# Patient Record
Sex: FEMALE | Race: WHITE | NOT HISPANIC OR LATINO | Employment: FULL TIME | URBAN - METROPOLITAN AREA
[De-identification: names, ages, dates, MRNs, and addresses within clinical notes are randomized per-mention and may not be internally consistent; named-entity substitution may affect disease eponyms.]

---

## 2021-05-29 ENCOUNTER — RECORDS - HEALTHEAST (OUTPATIENT)
Dept: ADMINISTRATIVE | Facility: CLINIC | Age: 61
End: 2021-05-29

## 2021-05-30 ENCOUNTER — RECORDS - HEALTHEAST (OUTPATIENT)
Dept: ADMINISTRATIVE | Facility: CLINIC | Age: 61
End: 2021-05-30

## 2021-05-31 ENCOUNTER — RECORDS - HEALTHEAST (OUTPATIENT)
Dept: ADMINISTRATIVE | Facility: CLINIC | Age: 61
End: 2021-05-31

## 2021-08-03 ENCOUNTER — TRANSFERRED RECORDS (OUTPATIENT)
Dept: HEALTH INFORMATION MANAGEMENT | Facility: CLINIC | Age: 61
End: 2021-08-03

## 2021-11-05 ENCOUNTER — OFFICE VISIT (OUTPATIENT)
Dept: NEUROLOGY | Facility: CLINIC | Age: 61
End: 2021-11-05
Payer: COMMERCIAL

## 2021-11-05 ENCOUNTER — LAB (OUTPATIENT)
Dept: LAB | Facility: HOSPITAL | Age: 61
End: 2021-11-05
Payer: COMMERCIAL

## 2021-11-05 VITALS
HEIGHT: 67 IN | DIASTOLIC BLOOD PRESSURE: 89 MMHG | WEIGHT: 162 LBS | HEART RATE: 70 BPM | BODY MASS INDEX: 25.43 KG/M2 | SYSTOLIC BLOOD PRESSURE: 130 MMHG

## 2021-11-05 DIAGNOSIS — G93.0 ARACHNOID CYST: ICD-10-CM

## 2021-11-05 DIAGNOSIS — G43.109 MIGRAINE WITH AURA AND WITHOUT STATUS MIGRAINOSUS, NOT INTRACTABLE: ICD-10-CM

## 2021-11-05 PROBLEM — K63.5 COLON POLYPS: Status: ACTIVE | Noted: 2020-05-06

## 2021-11-05 PROBLEM — R07.89 ATYPICAL CHEST PAIN: Status: ACTIVE | Noted: 2020-05-06

## 2021-11-05 LAB
ALBUMIN SERPL-MCNC: 4.2 G/DL (ref 3.5–5)
ALP SERPL-CCNC: 68 U/L (ref 45–120)
ALT SERPL W P-5'-P-CCNC: 28 U/L (ref 0–45)
AST SERPL W P-5'-P-CCNC: 26 U/L (ref 0–40)
BILIRUB DIRECT SERPL-MCNC: 0.2 MG/DL
BILIRUB SERPL-MCNC: 0.5 MG/DL (ref 0–1)
PROT SERPL-MCNC: 7.5 G/DL (ref 6–8)

## 2021-11-05 PROCEDURE — 99205 OFFICE O/P NEW HI 60 MIN: CPT | Performed by: PSYCHIATRY & NEUROLOGY

## 2021-11-05 PROCEDURE — 80076 HEPATIC FUNCTION PANEL: CPT

## 2021-11-05 PROCEDURE — 36415 COLL VENOUS BLD VENIPUNCTURE: CPT

## 2021-11-05 RX ORDER — DIVALPROEX SODIUM 500 MG/1
500 TABLET, EXTENDED RELEASE ORAL AT BEDTIME
Qty: 30 TABLET | Refills: 11 | Status: SHIPPED | OUTPATIENT
Start: 2021-11-05 | End: 2022-02-11

## 2021-11-05 RX ORDER — MULTIVITAMIN,THERAPEUTIC
1 TABLET ORAL
COMMUNITY

## 2021-11-05 RX ORDER — CETIRIZINE HYDROCHLORIDE 10 MG/1
10 TABLET ORAL
COMMUNITY

## 2021-11-05 RX ORDER — ESCITALOPRAM OXALATE 10 MG/1
15 TABLET ORAL DAILY
COMMUNITY

## 2021-11-05 RX ORDER — RIZATRIPTAN BENZOATE 10 MG/1
10 TABLET, ORALLY DISINTEGRATING ORAL
Qty: 12 TABLET | Refills: 3 | Status: SHIPPED | OUTPATIENT
Start: 2021-11-05 | End: 2022-02-11

## 2021-11-05 ASSESSMENT — MIFFLIN-ST. JEOR: SCORE: 1342.46

## 2021-11-05 NOTE — LETTER
2021         RE: Ramila England  [address] [facility] [address]        Dear Colleague,    Thank you for referring your patient, Ramila England, to the [facility] [place]. Please see a copy of my visit note below.    NEUROLOGY CONSULTATION NOTE       [facility] [place]  [address] [place] [address]  Tel: [phone]  Fax: [phone]  [email or web address]     Ramila England,  1960, MRN [id number]  PCP: Marianela Puente  Date: 2021     ASSESSMENT & PLAN     Visit Diagnosis  1. Data Unavailable     Migraine headache with aura  Pleasant 61-year-old female with history of atypical chest pain, right frontal arachnoid cyst, seizures in  who was referred for evaluation of worsening headaches.  Her history sounds fairly typical for migraine headache with aura.  I have recommended:    1.  Start Depakote  mg at bedtime for migraine prophylaxis  2.  Check hepatic profile  3.  Maxalt MLT 10 mg sublingually as needed headache she can repeat 1 tablet in 2-hour if needed.  Maximum 3 tablets in 24 hours  4.  Patient has history of seizures in  and was on anticonvulsants for 5 years and discontinued irrigation in  and has remained symptom-free  5.  She has a right frontal arachnoid cyst that has remained stable since  and no intervention is needed but I would recommend repeating a imaging study in 1 year to ensure stability.  6.  Follow-up will be in 2 months    Thank you again for this referral, please feel free to contact me if you have any questions.    Efren Treviño MD  [facility]  (Formerly, [facility]     REASON FOR CONSULTATION Headache        HISTORY OF PRESENT ILLNESS     We have been requested by Marianela Puente to evaluate Ramila England who is a 61 year old  female for headache    Patient is a 61-year-old female with history of atypical  chest pain, right frontal arachnoid cyst, seizures who was referred for evaluation of worsening headaches.  According to patient her headaches started more than a year ago but in July she went to [place] and when she came back she had severe headache that lasted for a whole week.  Since then she has been having episodic headache that last the whole day.  She usually gets blurred vision followed by some squiggly line usually in her left visual field and afterwards she developed nausea and photophobia and phonophobia.  These headaches are occurring at least 4 times a month.  She is also noticed that the looking at some moving picture she gets dizzy and gets nauseated.  She had a MRI of the brain that showed right frontal arachnoid cyst along with small vessel ischemic disease.  She cannot identify any triggers for her headaches.  She was seen by her primary care physician and was treated for Lyme's disease with antibiotics.  Currently she is taking over-the-counter pain medication to treat her headaches.  She did try Ubrelvy for a short period but it was not approved by her insurance company.     PROBLEM LIST   Patient Active Problem List   Diagnosis Code     Atypical chest pain R07.89     Colon polyps K63.5     Right frontal arachnoid cyst G93.0     Migraine with aura and without status migrainosus, not intractable G43.109         PAST MEDICAL & SURGICAL HISTORY     Past Medical History:   Patient  has a past medical history of Seizure disorder (H) (2005).    Surgical History:  She  has no past surgical history on file.     SOCIAL HISTORY     Reviewed, and she  reports that she has never smoked. She has never used smokeless tobacco. She reports current alcohol use.     FAMILY HISTORY     Reviewed, and family history includes Alzheimer Disease in her mother; Asthma in her brother and father; Colon Cancer in her brother; Hypertension in her father; Myocardial Infarction in her maternal grandfather.     ALLERGIES  "    Allergies   Allergen Reactions     Penicillins Rash         REVIEW OF SYSTEMS     A 12 point review of system was performed and was negative except as outlined in the history of present illness.     HOME MEDICATIONS     Current Outpatient Rx   Medication Sig Dispense Refill     Calcium Carbonate Antacid 1177 MG CHEW        cetirizine (ZYRTEC) 10 MG tablet Take 10 mg by mouth       cholecalciferol 50 MCG (2000 UT) tablet Take 2,000 Units by mouth       divalproex sodium extended-release (DEPAKOTE ER) 500 MG 24 hr tablet Take 1 tablet (500 mg) by mouth At Bedtime 30 tablet 11     escitalopram (LEXAPRO) 10 MG tablet Take 10 mg by mouth daily       MAGNESIUM PO        Multiple Vitamin (THERA-TABS) TABS Take 1 tablet by mouth       rizatriptan (MAXALT-MLT) 10 MG ODT Take 1 tablet (10 mg) by mouth at onset of headache for migraine May repeat in 2 hours. Max 3 tablets/24 hours. 12 tablet 3         PHYSICAL EXAM     Vital signs  /89 (BP Location: Left arm, Patient Position: Sitting)   Pulse 70   Ht 1.702 m (5' 7\")   Wt 73.5 kg (162 lb)   BMI 25.37 kg/m      Weight:   162 lbs 0 oz    Patient is alert and oriented x4 in no acute distress. Vital signs were reviewed and are documented in electronic medical record. Neck was supple, no carotid bruits, thyromegaly, JVD, or lymphadenopathy was noted.   NEUROLOGY EXAM:    Patient s speech was normal with no aphasia or dysarthria. Mentation, and affect were also normal.     Funduscopic exam was normal, with normal cup to disc ratio. Cranial nerves II -XII were intact.     Patient had normal mass, tone and motor strength was 5/5 in all extremities without pronator drift.     Sensation was intact to light touch, pinprick, and vibratory sensation.     Reflexes were 1+ symmetrical with downgoing toes.     No dysmetria noted on FNF or HKS. Romberg was negative.    Gait testing was normal. Able to walk on toes/heels. Tandem walk normal.     DIAGNOSTIC STUDIES     PERTINENT " RADIOLOGY  Following imaging studies were reviewed:     CT BRAIN 8/12/2005  ASYMMETRIC FRONTAL GYRI ON THE RIGHT.  POSSIBLE CONGENITAL   ANOMALY/DYSPLASIA.  CONFIRM WITH MRI AS INDICATED.    MRI BRAIN 5/3/2021  1.  Asymmetric prominence of the CSF space of the right frontal convexity with thinning of the overlying calvarium most likely representing an arachnoid cyst.  Allowing for technical difference this is unchanged compared to study dated 8/12/2005  2.  No restricted diffusion blood product deposition or other acute finding  3.  Nonspecific T2 flair white matter hyperintensity  4.  Regressive remodeling of the right mandibular condyle indicative of internal derangement of the temporomandibular joint  5.  Mucosal thickening and retained secretion in the ethmoid sinuses.  Mucosal thickening with air-fluid level in the left maxillary sinus represent acute sinusitis.  Fluid in the mastoid air cells bilaterally     PERTINENT LABS  Following labs were reviewed:  No visits with results within 3 Month(s) from this visit.   Latest known visit with results is:   No results found for any previous visit.        Total time spent for face to face visit, reviewing labs/imaging studies, counseling and coordination of care was: 1 Hour spent on the date of the encounter doing chart review, review of outside records, review of test results, interpretation of tests, patient visit and documentation       This note was dictated using voice recognition software.  Any grammatical or context distortions are unintentional and inherent to the software.    Orders Placed This Encounter   Procedures     Hepatic function panel      New Prescriptions    DIVALPROEX SODIUM EXTENDED-RELEASE (DEPAKOTE ER) 500 MG 24 HR TABLET    Take 1 tablet (500 mg) by mouth At Bedtime    RIZATRIPTAN (MAXALT-MLT) 10 MG ODT    Take 1 tablet (10 mg) by mouth at onset of headache for migraine May repeat in 2 hours. Max 3 tablets/24 hours.      Modified Medications     No medications on file              Again, thank you for allowing me to participate in the care of your patient.        Sincerely,        Efren Treviño MD

## 2021-11-05 NOTE — NURSING NOTE
Patient: Ramila England  ZEHRAB: 1960  Sex: Female  Phone: [phone]    [facility] MRN: [id number]  Exam Date: 08/03/2021     EXAM: MRI BRAIN WITHOUT CONTRAST    CLINICAL INFORMATION: 60-year-old woman with headaches, and dizziness.    TECHNICAL INFORMATION: MRI brain was obtained on 1.5 Yarelis magnet including sagittal T1, axial T2, FLAIR, T1, gradient echo, and diffusion-weighted images with apparent diffusion coefficient maps. Coronal T2 weighted images.    INTERPRETATION: This exam is compared to computed tomography study dated 8/12/2005.    There are no areas of increased signal on the diffusion-weighted images to suggest an acute or subacute infarction. No blood product deposition within the brain on the gradient echo images. Ventricles are within normal limits. There is asymmetric prominence of the CSF space at the right frontal convexity with mass effect on the underlying brain and thinning of the overlying calvarium similar to the comparison study. Intracranial flow voids are unremarkable. There are a few punctate T2/FLAIR hyperintensities in the periventricular and subcortical white matter.    The craniocervical junction is normal. Midline structures including the sella and suprasellar region as well as the corpus callosum are unremarkable. The calvarial marrow signal is normal.    The orbits are normal in appearance. There is undulating nasal septal deviation. Minimal mucosal thickening and retained secretions in the ethmoid air cells bilaterally. Mucosal thickening in the left maxillary sinus with small air-fluid level. Fluid in the mastoid air cells bilaterally. Regressive remodeling of the right mandibular condyle.    CONCLUSION:    1. Asymmetric prominence of the CSF space at the right frontal convexity with thinning of the overlying calvarium most likely representing an arachnoid cyst. Allowing for technical differences this is unchanged compared to computed tomography study dated  8/12/2005.    2. No restricted diffusion, blood product deposition, or other acute findings.    3. Nonspecific T2/FLAIR white matter hyperintensities.    4. Regressive remodeling of the right mandibular condyle indicative of internal derangement of the temporomandibular joint.    5. Mucosal thickening and retained secretions in the ethmoid sinuses. Mucosal thickening with air-fluid level in the left maxillary sinus which may represent acute sinusitis. Fluid in the mastoid air cells bilaterally.    LPB        Electronically signed on 8/4/2021 2:44:00 PM by L. Paul Broadbent, M.D.   ---

## 2021-11-05 NOTE — PROGRESS NOTES
NEUROLOGY CONSULTATION NOTE       [facility]UR[facility] [place]  [address] [place] [address]  Tel: [phone]  Fax: [phone]  [email or web address]     Ramila England,  1960, MRN [id number]  PCP: Marianela Puente  Date: 2021     ASSESSMENT & PLAN     Visit Diagnosis  1. Data Unavailable     Migraine headache with aura  Pleasant 61-year-old female with history of atypical chest pain, right frontal arachnoid cyst, seizures in  who was referred for evaluation of worsening headaches.  Her history sounds fairly typical for migraine headache with aura.  I have recommended:    1.  Start Depakote  mg at bedtime for migraine prophylaxis  2.  Check hepatic profile  3.  Maxalt MLT 10 mg sublingually as needed headache she can repeat 1 tablet in 2-hour if needed.  Maximum 3 tablets in 24 hours  4.  Patient has history of seizures in  and was on anticonvulsants for 5 years and discontinued irrigation in  and has remained symptom-free  5.  She has a right frontal arachnoid cyst that has remained stable since  and no intervention is needed but I would recommend repeating a imaging study in 1 year to ensure stability.  6.  Follow-up will be in 2 months    Thank you again for this referral, please feel free to contact me if you have any questions.    Efren Treviño MD  [facility]  ([facility], [facility]     REASON FOR CONSULTATION Headache        HISTORY OF PRESENT ILLNESS     We have been requested by Marianela Puente to evaluate Ramila England who is a 61 year old  female for headache    Patient is a 61-year-old female with history of atypical chest pain, right frontal arachnoid cyst, seizures who was referred for evaluation of worsening headaches.  According to patient her headaches started more than a year ago but in July she went to [place] and when she came back she had severe headache that lasted for  a whole week.  Since then she has been having episodic headache that last the whole day.  She usually gets blurred vision followed by some squiggly line usually in her left visual field and afterwards she developed nausea and photophobia and phonophobia.  These headaches are occurring at least 4 times a month.  She is also noticed that the looking at some moving picture she gets dizzy and gets nauseated.  She had a MRI of the brain that showed right frontal arachnoid cyst along with small vessel ischemic disease.  She cannot identify any triggers for her headaches.  She was seen by her primary care physician and was treated for Lyme's disease with antibiotics.  Currently she is taking over-the-counter pain medication to treat her headaches.  She did try Ubrelvy for a short period but it was not approved by her insurance company.     PROBLEM LIST   Patient Active Problem List   Diagnosis Code     Atypical chest pain R07.89     Colon polyps K63.5     Right frontal arachnoid cyst G93.0     Migraine with aura and without status migrainosus, not intractable G43.109         PAST MEDICAL & SURGICAL HISTORY     Past Medical History:   Patient  has a past medical history of Seizure disorder (H) (2005).    Surgical History:  She  has no past surgical history on file.     SOCIAL HISTORY     Reviewed, and she  reports that she has never smoked. She has never used smokeless tobacco. She reports current alcohol use.     FAMILY HISTORY     Reviewed, and family history includes Alzheimer Disease in her mother; Asthma in her brother and father; Colon Cancer in her brother; Hypertension in her father; Myocardial Infarction in her maternal grandfather.     ALLERGIES     Allergies   Allergen Reactions     Penicillins Rash         REVIEW OF SYSTEMS     A 12 point review of system was performed and was negative except as outlined in the history of present illness.     HOME MEDICATIONS     Current Outpatient Rx   Medication Sig Dispense  "Refill     Calcium Carbonate Antacid 1177 MG CHEW        cetirizine (ZYRTEC) 10 MG tablet Take 10 mg by mouth       cholecalciferol 50 MCG (2000 UT) tablet Take 2,000 Units by mouth       divalproex sodium extended-release (DEPAKOTE ER) 500 MG 24 hr tablet Take 1 tablet (500 mg) by mouth At Bedtime 30 tablet 11     escitalopram (LEXAPRO) 10 MG tablet Take 10 mg by mouth daily       MAGNESIUM PO        Multiple Vitamin (THERA-TABS) TABS Take 1 tablet by mouth       rizatriptan (MAXALT-MLT) 10 MG ODT Take 1 tablet (10 mg) by mouth at onset of headache for migraine May repeat in 2 hours. Max 3 tablets/24 hours. 12 tablet 3         PHYSICAL EXAM     Vital signs  /89 (BP Location: Left arm, Patient Position: Sitting)   Pulse 70   Ht 1.702 m (5' 7\")   Wt 73.5 kg (162 lb)   BMI 25.37 kg/m      Weight:   162 lbs 0 oz    Patient is alert and oriented x4 in no acute distress. Vital signs were reviewed and are documented in electronic medical record. Neck was supple, no carotid bruits, thyromegaly, JVD, or lymphadenopathy was noted.   NEUROLOGY EXAM:    Patient s speech was normal with no aphasia or dysarthria. Mentation, and affect were also normal.     Funduscopic exam was normal, with normal cup to disc ratio. Cranial nerves II -XII were intact.     Patient had normal mass, tone and motor strength was 5/5 in all extremities without pronator drift.     Sensation was intact to light touch, pinprick, and vibratory sensation.     Reflexes were 1+ symmetrical with downgoing toes.     No dysmetria noted on FNF or HKS. Romberg was negative.    Gait testing was normal. Able to walk on toes/heels. Tandem walk normal.     DIAGNOSTIC STUDIES     PERTINENT RADIOLOGY  Following imaging studies were reviewed:     CT BRAIN 8/12/2005  ASYMMETRIC FRONTAL GYRI ON THE RIGHT.  POSSIBLE CONGENITAL   ANOMALY/DYSPLASIA.  CONFIRM WITH MRI AS INDICATED.    MRI BRAIN 5/3/2021  1.  Asymmetric prominence of the CSF space of the right frontal " convexity with thinning of the overlying calvarium most likely representing an arachnoid cyst.  Allowing for technical difference this is unchanged compared to study dated 8/12/2005  2.  No restricted diffusion blood product deposition or other acute finding  3.  Nonspecific T2 flair white matter hyperintensity  4.  Regressive remodeling of the right mandibular condyle indicative of internal derangement of the temporomandibular joint  5.  Mucosal thickening and retained secretion in the ethmoid sinuses.  Mucosal thickening with air-fluid level in the left maxillary sinus represent acute sinusitis.  Fluid in the mastoid air cells bilaterally     PERTINENT LABS  Following labs were reviewed:  No visits with results within 3 Month(s) from this visit.   Latest known visit with results is:   No results found for any previous visit.        Total time spent for face to face visit, reviewing labs/imaging studies, counseling and coordination of care was: 1 Hour spent on the date of the encounter doing chart review, review of outside records, review of test results, interpretation of tests, patient visit and documentation       This note was dictated using voice recognition software.  Any grammatical or context distortions are unintentional and inherent to the software.    Orders Placed This Encounter   Procedures     Hepatic function panel      New Prescriptions    DIVALPROEX SODIUM EXTENDED-RELEASE (DEPAKOTE ER) 500 MG 24 HR TABLET    Take 1 tablet (500 mg) by mouth At Bedtime    RIZATRIPTAN (MAXALT-MLT) 10 MG ODT    Take 1 tablet (10 mg) by mouth at onset of headache for migraine May repeat in 2 hours. Max 3 tablets/24 hours.      Modified Medications    No medications on file

## 2021-11-05 NOTE — PATIENT INSTRUCTIONS
Patient Education     About Migraine Headaches  What is a migraine headache?  A migraine is a special kind of headache. It can last a for hours or days. It may cause intense pain. You may also feel sick to your stomach or have eye problems.  What causes it?  We don't know the exact cause. They may be caused by a problem with blood flow to the brain. Or they may happen when brain chemicals don't stay balanced. You are more likely to get a migraine when:    You are under stress    You are tired    You eat some kinds of foods, such as wine, cheese, chocolate or chemicals added to foods    You are around bright lights  Women are more likely to have migraines than men. Sometimes the headaches happen around the time a woman has her period. Or they may happen when a woman is taking hormone pills.   Migraines can run in families.  What are symptoms?  Before a migraine, you may:    Not feel well    Lose part of your vision    See bright spots    See zigzags in front of your eyes  Most of the time, these problems go away when the headache starts. When you have a migraine, you may:    Have a headache that throbs or pounds (you may feel the pain more on one side of your head, or your whole head may hurt)    Be very sensitive to light    Have blurry vision    Vomit (throw up) or have nausea (feel sick to your stomach)    Have numbness or tingling in your face or arm  How is it diagnosed?  Your care team will ask you about your symptoms and medical history. They will examine you.   It may help to keep a headache diary. You should write down:    The date and time of each headache    How long it lasts    The type of pain (is it dull or sharp? Does it throb? Do you feel pressure?)    Where it hurts (for example, scalp, eyes, temples, neck)    What symptoms you had before the headache started    What you ate and drank before the headache    If you used cigarettes, caffeine, alcohol or soda    What time you went to bed the night  before, and what time you woke up that day    If you are a woman, you should also note if you are using birth control pills or taking other female hormones  If you just recently started having headaches, your care team may suggest tests to look for the causes of your symptoms. You may need a brain scan or MRI.  How is it treated?    You may need to take medicines to keep migraines from getting worse once they start. Take these medicines as soon as you can when you start to have signs of a migraine.    You may need to take another medicine every day to stop migraines from coming so often. The medicine can help prevent very bad migraines.    You may need to try a medicine for a few weeks to see if it works. Be sure to keep your follow-up appointments with your care team to see if a medicine is working and what you should try next. Talk to your care team about what is best for you. You may have many choices.  How long will it last?  The headache may last from a few hours to a few days. You may get migraines for the rest of your life. Most of the time, migraines happen less often as you get older.  How can I take care of myself?  As soon as the migraine starts:    Take a pain reliever. Ask your care team what medicine you should take.    Rest in a quiet, dark room until you start to feel better.    Don't drive a car while you have a migraine.    See your care team if your headaches get worse or if they don't get better when you take medicine for them. It may take a few visits to find the best way to control your headaches.  How can I prevent migraines?    Eat regular, healthy meals. Don't go too long without eating. Stay away from foods that seem to cause headaches. Watch out for:  ? Wine, raúl and beer  ? Cheese  ? Aged, canned and processed meats  ? Bread made with yeast  ? Foods with cheese, chocolate or nuts    Don't use medicines that can cause headaches. Ask your care team about this. You may need to stop using  birth control or hormone pills.    Don't smoke cigarettes    Get plenty of sleep every day    Lower your stress. Find time to relax, rest and have fun in your life.  When should I call my care team?  Call your care team right away if you have symptoms that you don't usually get with migraines or you have other unusual symptoms, such as:    Problems talking or your speech is slurred    A weak arm or leg that you can't move in a normal way    A fever higher than 100 F (37.8 C)    Stiff neck    Vomiting that lasts for a few hours  For more information  National Headache Foundation (NHF)  www.headaches.org.  For informational purposes only. Not to replace the advice of your health care provider.   Copyright   2011 [employer]. All rights reserved. Clinically reviewed by [facility]. Xueersi 530540 - 08/18.

## 2021-11-05 NOTE — NURSING NOTE
Chief Complaint   Patient presents with     Headache     Migraine headaches that started spring 2021 2x per week, auras, nausea     Daily Ward CMA on 11/5/2021 at 9:58 AM

## 2021-11-08 ENCOUNTER — TELEPHONE (OUTPATIENT)
Dept: NEUROLOGY | Facility: CLINIC | Age: 61
End: 2021-11-08
Payer: COMMERCIAL

## 2022-02-11 ENCOUNTER — LAB (OUTPATIENT)
Dept: LAB | Facility: HOSPITAL | Age: 62
End: 2022-02-11
Payer: COMMERCIAL

## 2022-02-11 ENCOUNTER — OFFICE VISIT (OUTPATIENT)
Dept: NEUROLOGY | Facility: CLINIC | Age: 62
End: 2022-02-11
Payer: COMMERCIAL

## 2022-02-11 VITALS
SYSTOLIC BLOOD PRESSURE: 137 MMHG | WEIGHT: 162 LBS | DIASTOLIC BLOOD PRESSURE: 81 MMHG | HEART RATE: 70 BPM | BODY MASS INDEX: 25.43 KG/M2 | HEIGHT: 67 IN

## 2022-02-11 DIAGNOSIS — G93.0 ARACHNOID CYST: Primary | ICD-10-CM

## 2022-02-11 DIAGNOSIS — G43.109 MIGRAINE WITH AURA AND WITHOUT STATUS MIGRAINOSUS, NOT INTRACTABLE: ICD-10-CM

## 2022-02-11 LAB
ALBUMIN SERPL-MCNC: 4 G/DL (ref 3.5–5)
ALP SERPL-CCNC: 54 U/L (ref 45–120)
ALT SERPL W P-5'-P-CCNC: 40 U/L (ref 0–45)
AMMONIA PLAS-SCNC: 16 UMOL/L (ref 11–35)
AST SERPL W P-5'-P-CCNC: 36 U/L (ref 0–40)
BILIRUB DIRECT SERPL-MCNC: 0.2 MG/DL
BILIRUB SERPL-MCNC: 0.5 MG/DL (ref 0–1)
PROT SERPL-MCNC: 7.2 G/DL (ref 6–8)

## 2022-02-11 PROCEDURE — 82140 ASSAY OF AMMONIA: CPT

## 2022-02-11 PROCEDURE — 36415 COLL VENOUS BLD VENIPUNCTURE: CPT

## 2022-02-11 PROCEDURE — 82040 ASSAY OF SERUM ALBUMIN: CPT

## 2022-02-11 PROCEDURE — 99214 OFFICE O/P EST MOD 30 MIN: CPT | Performed by: PSYCHIATRY & NEUROLOGY

## 2022-02-11 RX ORDER — RIZATRIPTAN BENZOATE 10 MG/1
10 TABLET, ORALLY DISINTEGRATING ORAL
Qty: 12 TABLET | Refills: 3 | Status: SHIPPED | OUTPATIENT
Start: 2022-02-11 | End: 2023-03-09

## 2022-02-11 RX ORDER — DIVALPROEX SODIUM 500 MG/1
500 TABLET, EXTENDED RELEASE ORAL AT BEDTIME
Qty: 30 TABLET | Refills: 11 | Status: SHIPPED | OUTPATIENT
Start: 2022-02-11 | End: 2023-02-21

## 2022-02-11 ASSESSMENT — MIFFLIN-ST. JEOR: SCORE: 1342.46

## 2022-02-11 NOTE — LETTER
2022         RE: Ramila England  [address] [facility] [address]        Dear Colleague,    Thank you for referring your patient, Ramila England, to the [facility] [place]. Please see a copy of my visit note below.    NEUROLOGY FOLLOW UP VISIT  NOTE       [facility] [place]  [address] [place] [address]  Tel: [phone]  Fax: [phone]  [email or web address]     Ramila England,  1960, MRN [id number]  PCP: Marianela Puente  Date: 2022      ASSESSMENT & PLAN     Visit Diagnosis  1. Arachnoid cyst  2. Migraine with aura and without status migrainosus, not intractable     Migraine headache with aura  61-year-old female with history of atypical chest pain, right frontal arachnoid cyst, seizures in  who was evaluated for worsening headaches.  She was started on Depakote and has noticed a significant improvement in her headache.  Although she has Maxalt prescribed she only takes Excedrin as needed as Depakote has helped her symptoms significantly.  I have recommended:    1.  Continue Depakote  mg at bedtime  2.  Check hepatic profile and ammonia level  3.  Continue Maxalt MLT 10 mg sublingually for severe headache, minor headache she will take over-the-counter Excedrin or ibuprofen  4.  She has history of seizures in  and was on anticonvulsants that were discontinued and 2010  5.  She has a right frontal arachnoid cyst that has remained stable since  and most recent MRI done last year shows no change.  I plan on repeating MRI next year and if stable we can monitor it less frequently.  6.  Follow-up will be in 1 year    Thank you again for this referral, please feel free to contact me if you have any questions.    Efren Treviño MD  [facility]  (Former[facility], [facility]     HISTORY OF PRESENT ILLNESS     Patient is a 61-year-old female with  history of atypical chest pain, right frontal arachnoid cyst, seizures in the past who was initially seen in our clinic on 11/5/2021 for worsening headaches and started on Depakote who returns for follow-up.  Patient reported that her headache started more than a year ago but since July 2021 after she went to [place] headaches got worse and would last for a whole week.  She started having episodic headaches that would affect her activities of daily living and were associated with blurred vision and some squiggly lines in her left visual field.  Afterwards, she would develop nausea vomiting photophobia and phonophobia.  She had MRI of the head that showed a right frontal arachnoid cyst with small vessel ischemic disease and I told her that it does not require any intervention but a follow-up MRI scan in 1 year was recommended to ensure stability.  For abortive treatment she was told to take Maxalt.      Since her last visit she reports significant improvement in her headaches.  She only gets a couple of headaches in a month and that 2 are not severe.  She does not even take Maxalt and takes over-the-counter Excedrin Migraine or Aleve.  She is quite happy with the results.  She denies any side effects including hair loss abdominal discomfort or any tremors.     PROBLEM LIST   Patient Active Problem List   Diagnosis Code     Atypical chest pain R07.89     Colon polyps K63.5     Right frontal arachnoid cyst G93.0     Migraine with aura and without status migrainosus, not intractable G43.109         PAST MEDICAL & SURGICAL HISTORY     Past Medical History:   Patient  has a past medical history of Seizure disorder (H) (2005).    Surgical History:  She  has no past surgical history on file.     SOCIAL HISTORY     Reviewed, and she  reports that she has never smoked. She has never used smokeless tobacco. She reports current alcohol use.     FAMILY HISTORY     Reviewed, and family history includes Alzheimer Disease in her  "mother; Asthma in her brother and father; Colon Cancer in her brother; Hypertension in her father; Myocardial Infarction in her maternal grandfather.     ALLERGIES     Allergies   Allergen Reactions     Penicillins Rash         REVIEW OF SYSTEMS     A 12 point review of system was performed and was negative except as outlined in the history of present illness.     HOME MEDICATIONS     Current Outpatient Rx   Medication Sig Dispense Refill     Calcium Carbonate Antacid 1177 MG CHEW        cetirizine (ZYRTEC) 10 MG tablet Take 10 mg by mouth       cholecalciferol 50 MCG (2000 UT) tablet Take 2,000 Units by mouth       divalproex sodium extended-release (DEPAKOTE ER) 500 MG 24 hr tablet Take 1 tablet (500 mg) by mouth At Bedtime 30 tablet 11     escitalopram (LEXAPRO) 10 MG tablet Take 15 mg by mouth daily        MAGNESIUM PO        Multiple Vitamin (THERA-TABS) TABS Take 1 tablet by mouth       rizatriptan (MAXALT-MLT) 10 MG ODT Take 1 tablet (10 mg) by mouth at onset of headache for migraine May repeat in 2 hours. Max 3 tablets/24 hours. 12 tablet 3         PHYSICAL EXAM     Vital signs  /81 (BP Location: Right arm, Patient Position: Sitting)   Pulse 70   Ht 1.702 m (5' 7\")   Wt 73.5 kg (162 lb)   BMI 25.37 kg/m      Weight:   162 lbs 0 oz    Patient is alert and oriented x4 in no acute distress. Vital signs were reviewed and are documented in electronic medical record. Neck was supple, no carotid bruits, thyromegaly, JVD, or lymphadenopathy was noted.   NEUROLOGY EXAM:    Patient s speech was normal with no aphasia or dysarthria. Mentation, and affect were also normal.     Funduscopic exam was normal, with normal cup to disc ratio. Cranial nerves II -XII were intact.     Patient had normal mass, tone and motor strength was 5/5 in all extremities without pronator drift.     Sensation was intact to light touch, pinprick, and vibratory sensation.     Reflexes were 1+ symmetrical with downgoing toes.     No " dysmetria noted on FNF or HKS. Romberg was negative.    Gait testing was normal. Able to walk on toes/heels. Tandem walk normal.     PERTINENT DIAGNOSTIC STUDIES     Following studies were reviewed:     CT BRAIN 8/12/2005  ASYMMETRIC FRONTAL GYRI ON THE RIGHT.  POSSIBLE CONGENITAL   ANOMALY/DYSPLASIA.  CONFIRM WITH MRI AS INDICATED.     MRI BRAIN 5/3/2021  1.  Asymmetric prominence of the CSF space of the right frontal convexity with thinning of the overlying calvarium most likely representing an arachnoid cyst.  Allowing for technical difference this is unchanged compared to study dated 8/12/2005  2.  No restricted diffusion blood product deposition or other acute finding  3.  Nonspecific T2 flair white matter hyperintensity  4.  Regressive remodeling of the right mandibular condyle indicative of internal derangement of the temporomandibular joint  5.  Mucosal thickening and retained secretion in the ethmoid sinuses.  Mucosal thickening with air-fluid level in the left maxillary sinus represent acute sinusitis.  Fluid in the mastoid air cells bilaterally     PERTINENT LABS  Following labs were reviewed:  No visits with results within 3 Month(s) from this visit.   Latest known visit with results is:   Lab on 11/05/2021   Component Date Value     Bilirubin Total 11/05/2021 0.5      Bilirubin Direct 11/05/2021 0.2      Protein Total 11/05/2021 7.5      Albumin 11/05/2021 4.2      Alkaline Phosphatase 11/05/2021 68      AST 11/05/2021 26      ALT 11/05/2021 28          Total time spent for face to face visit, reviewing labs/imaging studies, counseling and coordination of care was: 30 Minutes spent on the date of the encounter doing chart review, review of outside records, review of test results, interpretation of tests, patient visit and documentation       This note was dictated using voice recognition software.  Any grammatical or context distortions are unintentional and inherent to the software.    Orders Placed  This Encounter   Procedures     Ammonia     Hepatic function panel      New Prescriptions    No medications on file     Modified Medications    Modified Medication Previous Medication    DIVALPROEX SODIUM EXTENDED-RELEASE (DEPAKOTE ER) 500 MG 24 HR TABLET divalproex sodium extended-release (DEPAKOTE ER) 500 MG 24 hr tablet       Take 1 tablet (500 mg) by mouth At Bedtime    Take 1 tablet (500 mg) by mouth At Bedtime    RIZATRIPTAN (MAXALT-MLT) 10 MG ODT rizatriptan (MAXALT-MLT) 10 MG ODT       Take 1 tablet (10 mg) by mouth at onset of headache for migraine May repeat in 2 hours. Max 3 tablets/24 hours.    Take 1 tablet (10 mg) by mouth at onset of headache for migraine May repeat in 2 hours. Max 3 tablets/24 hours.                     Again, thank you for allowing me to participate in the care of your patient.        Sincerely,        Efren Treviño MD

## 2022-02-11 NOTE — PATIENT INSTRUCTIONS
Patient Education     About Migraine Headaches  What is a migraine headache?  A migraine is a special kind of headache. It can last a for hours or days. It may cause intense pain. You may also feel sick to your stomach or have eye problems.  What causes it?  We don't know the exact cause. They may be caused by a problem with blood flow to the brain. Or they may happen when brain chemicals don't stay balanced. You are more likely to get a migraine when:    You are under stress    You are tired    You eat some kinds of foods, such as wine, cheese, chocolate or chemicals added to foods    You are around bright lights  Women are more likely to have migraines than men. Sometimes the headaches happen around the time a woman has her period. Or they may happen when a woman is taking hormone pills.   Migraines can run in families.  What are symptoms?  Before a migraine, you may:    Not feel well    Lose part of your vision    See bright spots    See zigzags in front of your eyes  Most of the time, these problems go away when the headache starts. When you have a migraine, you may:    Have a headache that throbs or pounds (you may feel the pain more on one side of your head, or your whole head may hurt)    Be very sensitive to light    Have blurry vision    Vomit (throw up) or have nausea (feel sick to your stomach)    Have numbness or tingling in your face or arm  How is it diagnosed?  Your care team will ask you about your symptoms and medical history. They will examine you.   It may help to keep a headache diary. You should write down:    The date and time of each headache    How long it lasts    The type of pain (is it dull or sharp? Does it throb? Do you feel pressure?)    Where it hurts (for example, scalp, eyes, temples, neck)    What symptoms you had before the headache started    What you ate and drank before the headache    If you used cigarettes, caffeine, alcohol or soda    What time you went to bed the night  before, and what time you woke up that day    If you are a woman, you should also note if you are using birth control pills or taking other female hormones  If you just recently started having headaches, your care team may suggest tests to look for the causes of your symptoms. You may need a brain scan or MRI.  How is it treated?    You may need to take medicines to keep migraines from getting worse once they start. Take these medicines as soon as you can when you start to have signs of a migraine.    You may need to take another medicine every day to stop migraines from coming so often. The medicine can help prevent very bad migraines.    You may need to try a medicine for a few weeks to see if it works. Be sure to keep your follow-up appointments with your care team to see if a medicine is working and what you should try next. Talk to your care team about what is best for you. You may have many choices.  How long will it last?  The headache may last from a few hours to a few days. You may get migraines for the rest of your life. Most of the time, migraines happen less often as you get older.  How can I take care of myself?  As soon as the migraine starts:    Take a pain reliever. Ask your care team what medicine you should take.    Rest in a quiet, dark room until you start to feel better.    Don't drive a car while you have a migraine.    See your care team if your headaches get worse or if they don't get better when you take medicine for them. It may take a few visits to find the best way to control your headaches.  How can I prevent migraines?    Eat regular, healthy meals. Don't go too long without eating. Stay away from foods that seem to cause headaches. Watch out for:  ? Wine, raúl and beer  ? Cheese  ? Aged, canned and processed meats  ? Bread made with yeast  ? Foods with cheese, chocolate or nuts    Don't use medicines that can cause headaches. Ask your care team about this. You may need to stop using  birth control or hormone pills.    Don't smoke cigarettes    Get plenty of sleep every day    Lower your stress. Find time to relax, rest and have fun in your life.  When should I call my care team?  Call your care team right away if you have symptoms that you don't usually get with migraines or you have other unusual symptoms, such as:    Problems talking or your speech is slurred    A weak arm or leg that you can't move in a normal way    A fever higher than 100 F (37.8 C)    Stiff neck    Vomiting that lasts for a few hours  For more information  National Headache Foundation (NHF)  www.headaches.org.  For informational purposes only. Not to replace the advice of your health care provider.   Copyright   2011 [employer]. All rights reserved. Clinically reviewed by [facility]. AMEE 732892 - 08/18.

## 2022-02-11 NOTE — PROGRESS NOTES
NEUROLOGY FOLLOW UP VISIT  NOTE       [facility] [place]  [address] [place] [address]  Tel: [phone]  Fax: [phone]  [email or web address]     Ramila England,  1960, MRN [id number]  PCP: Marianela Puente  Date: 2022      ASSESSMENT & PLAN     Visit Diagnosis  1. Arachnoid cyst  2. Migraine with aura and without status migrainosus, not intractable     Migraine headache with aura  61-year-old female with history of atypical chest pain, right frontal arachnoid cyst, seizures in  who was evaluated for worsening headaches.  She was started on Depakote and has noticed a significant improvement in her headache.  Although she has Maxalt prescribed she only takes Excedrin as needed as Depakote has helped her symptoms significantly.  I have recommended:    1.  Continue Depakote  mg at bedtime  2.  Check hepatic profile and ammonia level  3.  Continue Maxalt MLT 10 mg sublingually for severe headache, minor headache she will take over-the-counter Excedrin or ibuprofen  4.  She has history of seizures in  and was on anticonvulsants that were discontinued and 2010  5.  She has a right frontal arachnoid cyst that has remained stable since  and most recent MRI done last year shows no change.  I plan on repeating MRI next year and if stable we can monitor it less frequently.  6.  Follow-up will be in 1 year    Thank you again for this referral, please feel free to contact me if you have any questions.    Efren Treviño MD  [facility]  (Former[facility], [facility]     HISTORY OF PRESENT ILLNESS     Patient is a 61-year-old female with history of atypical chest pain, right frontal arachnoid cyst, seizures in the past who was initially seen in our clinic on 2021 for worsening headaches and started on Depakote who returns for follow-up.  Patient reported that her headache started more than a year  ago but since July 2021 after she went to [place] headaches got worse and would last for a whole week.  She started having episodic headaches that would affect her activities of daily living and were associated with blurred vision and some squiggly lines in her left visual field.  Afterwards, she would develop nausea vomiting photophobia and phonophobia.  She had MRI of the head that showed a right frontal arachnoid cyst with small vessel ischemic disease and I told her that it does not require any intervention but a follow-up MRI scan in 1 year was recommended to ensure stability.  For abortive treatment she was told to take Maxalt.      Since her last visit she reports significant improvement in her headaches.  She only gets a couple of headaches in a month and that 2 are not severe.  She does not even take Maxalt and takes over-the-counter Excedrin Migraine or Aleve.  She is quite happy with the results.  She denies any side effects including hair loss abdominal discomfort or any tremors.     PROBLEM LIST   Patient Active Problem List   Diagnosis Code     Atypical chest pain R07.89     Colon polyps K63.5     Right frontal arachnoid cyst G93.0     Migraine with aura and without status migrainosus, not intractable G43.109         PAST MEDICAL & SURGICAL HISTORY     Past Medical History:   Patient  has a past medical history of Seizure disorder (H) (2005).    Surgical History:  She  has no past surgical history on file.     SOCIAL HISTORY     Reviewed, and she  reports that she has never smoked. She has never used smokeless tobacco. She reports current alcohol use.     FAMILY HISTORY     Reviewed, and family history includes Alzheimer Disease in her mother; Asthma in her brother and father; Colon Cancer in her brother; Hypertension in her father; Myocardial Infarction in her maternal grandfather.     ALLERGIES     Allergies   Allergen Reactions     Penicillins Rash         REVIEW OF SYSTEMS     A 12 point review of  "system was performed and was negative except as outlined in the history of present illness.     HOME MEDICATIONS     Current Outpatient Rx   Medication Sig Dispense Refill     Calcium Carbonate Antacid 1177 MG CHEW        cetirizine (ZYRTEC) 10 MG tablet Take 10 mg by mouth       cholecalciferol 50 MCG (2000 UT) tablet Take 2,000 Units by mouth       divalproex sodium extended-release (DEPAKOTE ER) 500 MG 24 hr tablet Take 1 tablet (500 mg) by mouth At Bedtime 30 tablet 11     escitalopram (LEXAPRO) 10 MG tablet Take 15 mg by mouth daily        MAGNESIUM PO        Multiple Vitamin (THERA-TABS) TABS Take 1 tablet by mouth       rizatriptan (MAXALT-MLT) 10 MG ODT Take 1 tablet (10 mg) by mouth at onset of headache for migraine May repeat in 2 hours. Max 3 tablets/24 hours. 12 tablet 3         PHYSICAL EXAM     Vital signs  /81 (BP Location: Right arm, Patient Position: Sitting)   Pulse 70   Ht 1.702 m (5' 7\")   Wt 73.5 kg (162 lb)   BMI 25.37 kg/m      Weight:   162 lbs 0 oz    Patient is alert and oriented x4 in no acute distress. Vital signs were reviewed and are documented in electronic medical record. Neck was supple, no carotid bruits, thyromegaly, JVD, or lymphadenopathy was noted.   NEUROLOGY EXAM:    Patient s speech was normal with no aphasia or dysarthria. Mentation, and affect were also normal.     Funduscopic exam was normal, with normal cup to disc ratio. Cranial nerves II -XII were intact.     Patient had normal mass, tone and motor strength was 5/5 in all extremities without pronator drift.     Sensation was intact to light touch, pinprick, and vibratory sensation.     Reflexes were 1+ symmetrical with downgoing toes.     No dysmetria noted on FNF or HKS. Romberg was negative.    Gait testing was normal. Able to walk on toes/heels. Tandem walk normal.     PERTINENT DIAGNOSTIC STUDIES     Following studies were reviewed:     CT BRAIN 8/12/2005  ASYMMETRIC FRONTAL GYRI ON THE RIGHT.  POSSIBLE " CONGENITAL   ANOMALY/DYSPLASIA.  CONFIRM WITH MRI AS INDICATED.     MRI BRAIN 5/3/2021  1.  Asymmetric prominence of the CSF space of the right frontal convexity with thinning of the overlying calvarium most likely representing an arachnoid cyst.  Allowing for technical difference this is unchanged compared to study dated 8/12/2005  2.  No restricted diffusion blood product deposition or other acute finding  3.  Nonspecific T2 flair white matter hyperintensity  4.  Regressive remodeling of the right mandibular condyle indicative of internal derangement of the temporomandibular joint  5.  Mucosal thickening and retained secretion in the ethmoid sinuses.  Mucosal thickening with air-fluid level in the left maxillary sinus represent acute sinusitis.  Fluid in the mastoid air cells bilaterally     PERTINENT LABS  Following labs were reviewed:  No visits with results within 3 Month(s) from this visit.   Latest known visit with results is:   Lab on 11/05/2021   Component Date Value     Bilirubin Total 11/05/2021 0.5      Bilirubin Direct 11/05/2021 0.2      Protein Total 11/05/2021 7.5      Albumin 11/05/2021 4.2      Alkaline Phosphatase 11/05/2021 68      AST 11/05/2021 26      ALT 11/05/2021 28          Total time spent for face to face visit, reviewing labs/imaging studies, counseling and coordination of care was: 30 Minutes spent on the date of the encounter doing chart review, review of outside records, review of test results, interpretation of tests, patient visit and documentation       This note was dictated using voice recognition software.  Any grammatical or context distortions are unintentional and inherent to the software.    Orders Placed This Encounter   Procedures     Ammonia     Hepatic function panel      New Prescriptions    No medications on file     Modified Medications    Modified Medication Previous Medication    DIVALPROEX SODIUM EXTENDED-RELEASE (DEPAKOTE ER) 500 MG 24 HR TABLET divalproex sodium  extended-release (DEPAKOTE ER) 500 MG 24 hr tablet       Take 1 tablet (500 mg) by mouth At Bedtime    Take 1 tablet (500 mg) by mouth At Bedtime    RIZATRIPTAN (MAXALT-MLT) 10 MG ODT rizatriptan (MAXALT-MLT) 10 MG ODT       Take 1 tablet (10 mg) by mouth at onset of headache for migraine May repeat in 2 hours. Max 3 tablets/24 hours.    Take 1 tablet (10 mg) by mouth at onset of headache for migraine May repeat in 2 hours. Max 3 tablets/24 hours.

## 2022-03-06 ENCOUNTER — HEALTH MAINTENANCE LETTER (OUTPATIENT)
Age: 62
End: 2022-03-06

## 2022-11-21 ENCOUNTER — HEALTH MAINTENANCE LETTER (OUTPATIENT)
Age: 62
End: 2022-11-21

## 2023-02-20 DIAGNOSIS — G43.109 MIGRAINE WITH AURA AND WITHOUT STATUS MIGRAINOSUS, NOT INTRACTABLE: ICD-10-CM

## 2023-02-20 NOTE — LETTER
2/20/2023        RE: Ramila England  [address] [facility] [address]          Dear Ramila,      We recently provided you with medication refills.  Many medications require routine follow-up with your doctor.    Your prescription(s) have been refilled for 30 days so you may have time for the above noted follow-up. Please call to schedule soon so we can assure you have an appointment before your next refills are needed. If you have already made a follow up appointment, please disregard this letter.               Sincerely,        Efren Treviño MD  [facility]     (Formerly known as [facility])

## 2023-02-21 RX ORDER — DIVALPROEX SODIUM 500 MG/1
500 TABLET, EXTENDED RELEASE ORAL AT BEDTIME
Qty: 30 TABLET | Refills: 0 | Status: SHIPPED | OUTPATIENT
Start: 2023-02-21 | End: 2023-03-09

## 2023-02-21 NOTE — TELEPHONE ENCOUNTER
Refill request for: divalproex sodium extended-release (DEPAKOTE ER) 500 MG 24 hr tablet  Directions: Take 1 tablet (500 mg) by mouth At Bedtime    LOV: 2/11/22  NOV: Not scheduled Letter mailed to patient     30 day supply with 0 refills Medication T'd for review and signature  Daily Wadr CMA on 2/21/2023 at 1:33 PM

## 2023-03-08 NOTE — PROGRESS NOTES
__________________________________  ESTABLISHED PATIENT NEUROLOGY NOTE    DATE OF VISIT: 3/9/2023  MRN: [id number]  PATIENT NAME: Ramila England  YOB: 1960    Chief Complaint   Patient presents with     Migraine     No concerns     SUBJECTIVE:                                                      HISTORY OF PRESENT ILLNESS:  Ramila is here for follow up regarding migraines    Ramila England is a 62-year-old female with history of atypical chest pain, right frontal arachnoid cyst, seizures in the past who was initially seen in our clinic on 11/5/2021 for worsening headaches and started on Depakote. She follows with Dr. Treviño, last seen 02/11/2022. Per chart review, patient reported that her headache started more than a year ago but since July 2021 after she went to [place] headaches got worse and would last for a whole week.  She started having episodic headaches that would affect her activities of daily living and were associated with blurred vision and some squiggly lines in her left visual field.  Afterwards, she would develop nausea vomiting photophobia and phonophobia.  She had MRI of the head that showed a right frontal arachnoid cyst with small vessel ischemic disease and I told her that it does not require any intervention but a follow-up MRI scan in 1 year was recommended to ensure stability.  For abortive treatment she was told to take Maxalt.      Today 03/09/23:  Ramila arrives to the clinic for her annual migraine follow-up.  She describes her headaches as achy temporal pain that radiates across her forehead.  She states that Depakote has decreased the frequency and severity of her migraines.  She went from having frequent migraines to a couple of headaches per month.  Severity went from 8-10 out of 10 down to 3-4 out of 10.  She very rarely experiences the associated symptoms of nausea anymore.  Duration went from a full day down to approximately an hour with extra strength Tylenol  as abortive treatment.  She states she takes Ubrelvy or rizatriptan for more severe headaches with success.  Triggers include bright light.  Denies visual or speech changes.     Current Medications:   Calcium Carbonate Antacid 1177 MG CHEW,   cetirizine (ZYRTEC) 10 MG tablet, Take 10 mg by mouth  cholecalciferol 50 MCG (2000 UT) tablet, Take 2,000 Units by mouth  escitalopram (LEXAPRO) 10 MG tablet, Take 15 mg by mouth daily   MAGNESIUM PO,   Multiple Vitamin (THERA-TABS) TABS, Take 1 tablet by mouth    No current facility-administered medications on file prior to visit.    Past Medical History:   Patient  has a past medical history of Seizure disorder (H) (2005).  Surgical History:  She  has no past surgical history on file.  Family and Social History:  Reviewed, and she  reports that she has never smoked. She has never used smokeless tobacco. She reports current alcohol use.  Reviewed, and family history includes Alzheimer Disease in her mother; Asthma in her brother and father; Colon Cancer in her brother; Hypertension in her father; Myocardial Infarction in her maternal grandfather.      RECENT DIAGNOSTIC STUDIES:     Imaging:   CT BRAIN 8/12/2005  ASYMMETRIC FRONTAL GYRI ON THE RIGHT.  POSSIBLE CONGENITAL   ANOMALY/DYSPLASIA.  CONFIRM WITH MRI AS INDICATED.     MRI BRAIN 5/3/2021  1.  Asymmetric prominence of the CSF space of the right frontal convexity with thinning of the overlying calvarium most likely representing an arachnoid cyst.  Allowing for technical difference this is unchanged compared to study dated 8/12/2005  2.  No restricted diffusion blood product deposition or other acute finding  3.  Nonspecific T2 flair white matter hyperintensity  4.  Regressive remodeling of the right mandibular condyle indicative of internal derangement of the temporomandibular joint  5.  Mucosal thickening and retained secretion in the ethmoid sinuses.  Mucosal thickening with air-fluid level in the left maxillary sinus  "represent acute sinusitis.  Fluid in the mastoid air cells bilaterally     REVIEW OF SYSTEMS:                                                      10-point review of systems is negative except as mentioned above in HPI.    EXAM:                                                      Physical Exam:   Vitals: /71   Pulse 68   Ht 1.702 m (5' 7\")   Wt 76.9 kg (169 lb 9.6 oz)   BMI 26.56 kg/m    BMI= Body mass index is 26.56 kg/m .  GENERAL: NAD.  HEENT: NC/AT.  PULM: Non-labored breathing.   Neurologic:  MENTAL STATUS: Alert, attentive. Speech is fluent. Normal comprehension. Normal concentration. Adequate fund of knowledge.   CRANIAL NERVES: Visual fields intact to confrontation. Pupils equally, round and reactive to light. Facial sensation and movement normal. EOM full. Hearing intact to conversation. Trapezius strength intact. Palate moves symmetrically. Tongue midline.  MOTOR: 5/5 in proximal and distal muscle groups of upper and lower extremities. Tone and bulk normal.   DTRs: Brisk intact and symmetric in biceps, BR, and patellae.   SENSATION: Normal light touch throughout.   COORDINATION: Normal finger nose finger. Finger tapping normal. Knee heel shin normal.  STATION AND GAIT: Romberg Negative. Good postural reflexes. Casual gait and tandem Normal  Right  hand-dominant.      ASSESSMENT and PLAN:                                                      Assessment:    ICD-10-CM    1. Right frontal arachnoid cyst  G93.0 MR Brain w/o & w Contrast      2. Migraine with aura and without status migrainosus, not intractable  G43.109 divalproex sodium extended-release (DEPAKOTE ER) 500 MG 24 hr tablet     rizatriptan (MAXALT-MLT) 10 MG ODT        Ramila England is a 62-year-old female with history of atypical chest pain, right frontal arachnoid cyst, seizures in the past who was initially seen in our clinic on 11/5/2021 for worsening headaches and started on Depakote. She follows with Dr. Treviño, last seen " 02/11/2022. Patient has had a decrease in the frequency and severity of their headache on their current preventive therapy of Depakote 500 mg daily and abortive therapy of rizatriptan.  MRI ordered to monitor right frontal arachnoid cyst. We discussed interventions, and will plan to see the patient back in 12 months.  Ramila understands and agrees with this plan.     Plan:  --- Repeat MRI -monitor right frontal arachnoid cyst   --- Continue Preventive therapy: Depakote  mg at bedtime  --- Continue Abortive therapy: Maxalt MLT 10 mg sublingually for severe headache, minor headache she will take over-the-counter Excedrin or ibuprofen   --- If migraines are not fully treated with your abortive therapy alone you can add an over the counter medication like Aleve,Tylenol or Ibuprofen. Take at the same time as the first dose of your abortive therapy. Do not take over-the-counter medications more than 14 days a month to avoid rebound headaches.   --- Try Nonpharmacological interventions like heat or cold, massage, or rest  --- Practice good headache hygiene: Avoid known triggers, get adequate sleep, manage stress levels, stay hydrated and eat nutritious meals  --- Plan on follow up in the Neurology Clinic in 12 months.  --- Please feel free to reach out if you have any further questions or concerns.  --- Seek immediate medical attention if an emergency arises or if your health becomes progressively worse.     It was a pleasure meeting you today!     Total Time: Total time spent for face to face visit, reviewing labs/imaging studies, counseling and coordination of care was: 30 Minutes spent on the date of the encounter doing chart review, review of test results, patient visit and documentation       This note was dictated using voice recognition software.  Any grammatical or context distortions are unintentional and inherent to the software.    Yasmine Felipe, DNP, APRN, CNP  [facility]

## 2023-03-09 ENCOUNTER — OFFICE VISIT (OUTPATIENT)
Dept: NEUROLOGY | Facility: CLINIC | Age: 63
End: 2023-03-09
Payer: COMMERCIAL

## 2023-03-09 VITALS
SYSTOLIC BLOOD PRESSURE: 130 MMHG | BODY MASS INDEX: 26.62 KG/M2 | WEIGHT: 169.6 LBS | HEIGHT: 67 IN | HEART RATE: 68 BPM | DIASTOLIC BLOOD PRESSURE: 71 MMHG

## 2023-03-09 DIAGNOSIS — G93.0 ARACHNOID CYST: Primary | ICD-10-CM

## 2023-03-09 DIAGNOSIS — G43.109 MIGRAINE WITH AURA AND WITHOUT STATUS MIGRAINOSUS, NOT INTRACTABLE: ICD-10-CM

## 2023-03-09 PROCEDURE — 99214 OFFICE O/P EST MOD 30 MIN: CPT

## 2023-03-09 RX ORDER — RIZATRIPTAN BENZOATE 10 MG/1
10 TABLET, ORALLY DISINTEGRATING ORAL
Qty: 12 TABLET | Refills: 4 | Status: SHIPPED | OUTPATIENT
Start: 2023-03-09 | End: 2024-03-12

## 2023-03-09 RX ORDER — DIVALPROEX SODIUM 500 MG/1
500 TABLET, EXTENDED RELEASE ORAL AT BEDTIME
Qty: 90 TABLET | Refills: 3 | Status: SHIPPED | OUTPATIENT
Start: 2023-03-09 | End: 2024-03-12

## 2023-03-09 NOTE — NURSING NOTE
Chief Complaint   Patient presents with     Migraine     No concerns     Stacy Lowry on 3/9/2023 at 9:53 AM

## 2023-03-09 NOTE — PATIENT INSTRUCTIONS
Plan:  --- Repeat MRI -  Right frontal arachnoid cyst   --- Continue Preventive therapy: Depakote  mg at bedtime  --- Continue Abortive therapy: Maxalt MLT 10 mg sublingually for severe headache, minor headache she will take over-the-counter Excedrin or ibuprofen   --- If migraines are not fully treated with your abortive therapy alone you can add an over the counter medication like Aleve,Tylenol or Ibuprofen. Take at the same time as the first dose of your abortive therapy. Do not take over-the-counter medications more than 14 days a month to avoid rebound headaches.   --- Try Nonpharmacological interventions like heat or cold, massage, or rest  --- Practice good headache hygiene: Avoid known triggers, get adequate sleep, manage stress levels, stay hydrated and eat nutritious meals  --- Plan on follow up in the Neurology Clinic in 12 months.  --- Please feel free to reach out if you have any further questions or concerns.  --- Seek immediate medical attention if an emergency arises or if your health becomes progressively worse.     It was a pleasure meeting you today!

## 2023-03-09 NOTE — LETTER
3/9/2023         RE: Ramila England  [address] [id number]        Dear Colleague,    Thank you for referring your patient, Ramila England, to the [facility] [place]. Please see a copy of my visit note below.      __________________________________  ESTABLISHED PATIENT NEUROLOGY NOTE    DATE OF VISIT: 3/9/2023  MRN: [id number]  PATIENT NAME: Ramila England  YOB: 1960    Chief Complaint   Patient presents with     Migraine     No concerns     SUBJECTIVE:                                                      HISTORY OF PRESENT ILLNESS:  Ramila is here for follow up regarding migraines    Ramila England is a 62-year-old female with history of atypical chest pain, right frontal arachnoid cyst, seizures in the past who was initially seen in our clinic on 11/5/2021 for worsening headaches and started on Depakote. She follows with Dr. Treviño, last seen 02/11/2022. Per chart review, patient reported that her headache started more than a year ago but since July 2021 after she went to [place] headaches got worse and would last for a whole week.  She started having episodic headaches that would affect her activities of daily living and were associated with blurred vision and some squiggly lines in her left visual field.  Afterwards, she would develop nausea vomiting photophobia and phonophobia.  She had MRI of the head that showed a right frontal arachnoid cyst with small vessel ischemic disease and I told her that it does not require any intervention but a follow-up MRI scan in 1 year was recommended to ensure stability.  For abortive treatment she was told to take Maxalt.      Today 03/09/23:  Ramila arrives to the clinic for her annual migraine follow-up.  She describes her headaches as achy temporal pain that radiates across her forehead.  She states that Depakote has decreased the frequency and severity of her migraines.  She went from having  frequent migraines to a couple of headaches per month.  Severity went from 8-10 out of 10 down to 3-4 out of 10.  She very rarely experiences the associated symptoms of nausea anymore.  Duration went from a full day down to approximately an hour with extra strength Tylenol as abortive treatment.  She states she takes Ubrelvy or rizatriptan for more severe headaches with success.  Triggers include bright light.  Denies visual or speech changes.     Current Medications:   Calcium Carbonate Antacid 1177 MG CHEW,   cetirizine (ZYRTEC) 10 MG tablet, Take 10 mg by mouth  cholecalciferol 50 MCG (2000 UT) tablet, Take 2,000 Units by mouth  escitalopram (LEXAPRO) 10 MG tablet, Take 15 mg by mouth daily   MAGNESIUM PO,   Multiple Vitamin (THERA-TABS) TABS, Take 1 tablet by mouth    No current facility-administered medications on file prior to visit.    Past Medical History:   Patient  has a past medical history of Seizure disorder (H) (2005).  Surgical History:  She  has no past surgical history on file.  Family and Social History:  Reviewed, and she  reports that she has never smoked. She has never used smokeless tobacco. She reports current alcohol use.  Reviewed, and family history includes Alzheimer Disease in her mother; Asthma in her brother and father; Colon Cancer in her brother; Hypertension in her father; Myocardial Infarction in her maternal grandfather.      RECENT DIAGNOSTIC STUDIES:     Imaging:   CT BRAIN 8/12/2005  ASYMMETRIC FRONTAL GYRI ON THE RIGHT.  POSSIBLE CONGENITAL   ANOMALY/DYSPLASIA.  CONFIRM WITH MRI AS INDICATED.     MRI BRAIN 5/3/2021  1.  Asymmetric prominence of the CSF space of the right frontal convexity with thinning of the overlying calvarium most likely representing an arachnoid cyst.  Allowing for technical difference this is unchanged compared to study dated 8/12/2005  2.  No restricted diffusion blood product deposition or other acute finding  3.  Nonspecific T2 flair white matter  "hyperintensity  4.  Regressive remodeling of the right mandibular condyle indicative of internal derangement of the temporomandibular joint  5.  Mucosal thickening and retained secretion in the ethmoid sinuses.  Mucosal thickening with air-fluid level in the left maxillary sinus represent acute sinusitis.  Fluid in the mastoid air cells bilaterally     REVIEW OF SYSTEMS:                                                      10-point review of systems is negative except as mentioned above in HPI.    EXAM:                                                      Physical Exam:   Vitals: /71   Pulse 68   Ht 1.702 m (5' 7\")   Wt 76.9 kg (169 lb 9.6 oz)   BMI 26.56 kg/m    BMI= Body mass index is 26.56 kg/m .  GENERAL: NAD.  HEENT: NC/AT.  PULM: Non-labored breathing.   Neurologic:  MENTAL STATUS: Alert, attentive. Speech is fluent. Normal comprehension. Normal concentration. Adequate fund of knowledge.   CRANIAL NERVES: Visual fields intact to confrontation. Pupils equally, round and reactive to light. Facial sensation and movement normal. EOM full. Hearing intact to conversation. Trapezius strength intact. Palate moves symmetrically. Tongue midline.  MOTOR: 5/5 in proximal and distal muscle groups of upper and lower extremities. Tone and bulk normal.   DTRs: Brisk intact and symmetric in biceps, BR, and patellae.   SENSATION: Normal light touch throughout.   COORDINATION: Normal finger nose finger. Finger tapping normal. Knee heel shin normal.  STATION AND GAIT: Romberg Negative. Good postural reflexes. Casual gait and tandem Normal  Right  hand-dominant.      ASSESSMENT and PLAN:                                                      Assessment:    ICD-10-CM    1. Right frontal arachnoid cyst  G93.0 MR Brain w/o & w Contrast      2. Migraine with aura and without status migrainosus, not intractable  G43.109 divalproex sodium extended-release (DEPAKOTE ER) 500 MG 24 hr tablet     rizatriptan (MAXALT-MLT) 10 MG ODT "        Ramila England is a 62-year-old female with history of atypical chest pain, right frontal arachnoid cyst, seizures in the past who was initially seen in our clinic on 11/5/2021 for worsening headaches and started on Depakote. She follows with Dr. Treviño, last seen 02/11/2022. Patient has had a decrease in the frequency and severity of their headache on their current preventive therapy of Depakote 500 mg daily and abortive therapy of rizatriptan.  MRI ordered to monitor right frontal arachnoid cyst. We discussed interventions, and will plan to see the patient back in 12 months.  Ramila understands and agrees with this plan.     Plan:  --- Repeat MRI -monitor right frontal arachnoid cyst   --- Continue Preventive therapy: Depakote  mg at bedtime  --- Continue Abortive therapy: Maxalt MLT 10 mg sublingually for severe headache, minor headache she will take over-the-counter Excedrin or ibuprofen   --- If migraines are not fully treated with your abortive therapy alone you can add an over the counter medication like Aleve,Tylenol or Ibuprofen. Take at the same time as the first dose of your abortive therapy. Do not take over-the-counter medications more than 14 days a month to avoid rebound headaches.   --- Try Nonpharmacological interventions like heat or cold, massage, or rest  --- Practice good headache hygiene: Avoid known triggers, get adequate sleep, manage stress levels, stay hydrated and eat nutritious meals  --- Plan on follow up in the Neurology Clinic in 12 months.  --- Please feel free to reach out if you have any further questions or concerns.  --- Seek immediate medical attention if an emergency arises or if your health becomes progressively worse.     It was a pleasure meeting you today!     Total Time: Total time spent for face to face visit, reviewing labs/imaging studies, counseling and coordination of care was: 30 Minutes spent on the date of the encounter doing chart review, review of  test results, patient visit and documentation       This note was dictated using voice recognition software.  Any grammatical or context distortions are unintentional and inherent to the software.    Yasmine Felipe, MARY, APRN, CNP  [facility]         Again, thank you for allowing me to participate in the care of your patient.        Sincerely,        KAYLEIGH Pope CNP

## 2023-04-16 ENCOUNTER — HEALTH MAINTENANCE LETTER (OUTPATIENT)
Age: 63
End: 2023-04-16

## 2024-03-11 NOTE — PROGRESS NOTES
__________________________________  ESTABLISHED PATIENT NEUROLOGY NOTE    DATE OF VISIT: 3/9/2023  MRN: [id number]  PATIENT NAME: Ramila England  YOB: 1960    No chief complaint on file.    SUBJECTIVE:                                                      HISTORY OF PRESENT ILLNESS:  Ramila is here for follow up regarding migraines    Ramila England is a 63-year-old female with history of atypical chest pain, right frontal arachnoid cyst, seizures in the past who was initially seen in our clinic on 11/5/2021 for worsening headaches and started on Depakote. She follows with Dr. Treviño, last seen 02/11/2022. Per chart review, patient reported that her headache started more than a year ago but since July 2021 after she went to [place] headaches got worse and would last for a whole week.  She started having episodic headaches that would affect her activities of daily living and were associated with blurred vision and some squiggly lines in her left visual field.  Afterwards, she would develop nausea vomiting photophobia and phonophobia.  She had MRI of the head that showed a right frontal arachnoid cyst with small vessel ischemic disease and I told her that it does not require any intervention but a follow-up MRI scan in 1 year was recommended to ensure stability.  For abortive treatment she was told to take Maxalt.      03/09/23:Ramila arrives to the clinic for her annual migraine follow-up.  She describes her headaches as achy temporal pain that radiates across her forehead.  She states that Depakote has decreased the frequency and severity of her migraines.  She went from having frequent migraines to a couple of headaches per month.  Severity went from 8-10 out of 10 down to 3-4 out of 10.  She very rarely experiences the associated symptoms of nausea anymore.  Duration went from a full day down to approximately an hour with extra strength Tylenol as abortive treatment.  She states she takes  Ubrelvy or rizatriptan for more severe headaches with success.  Triggers include bright light.  Denies visual or speech changes.    03/12/24: Ramila presents to the clinic for annual migraine follow-up.  Patient reports that she continues to do well on her current regimen of Depakote 500 mg daily.  She continues to have a couple of headaches per month that are treated with ibuprofen or Tylenol.  She states she has not needed rizatriptan in months.  Patient had updated brain MRI through [facility] in 2021.  Arachnoid cyst looks to be unchanged compared to computed tomography study dated 8/12/2005. No other questions of concerns.  Patient reports that she had labs drawn through her primary care provider yesterday.  She will have them sent to the clinic for evaluation.     Current Medications:   Calcium Carbonate Antacid 1177 MG CHEW,   cetirizine (ZYRTEC) 10 MG tablet, Take 10 mg by mouth  cholecalciferol 50 MCG (2000 UT) tablet, Take 2,000 Units by mouth  escitalopram (LEXAPRO) 10 MG tablet, Take 15 mg by mouth daily   MAGNESIUM PO,   Multiple Vitamin (THERA-TABS) TABS, Take 1 tablet by mouth    No current facility-administered medications on file prior to visit.    Past Medical History:   Patient  has a past medical history of Seizure disorder (H) (2005).  Surgical History:  She  has no past surgical history on file.  Family and Social History:  Reviewed, and she  reports that she has never smoked. She has never used smokeless tobacco. She reports current alcohol use.  Reviewed, and family history includes Alzheimer Disease in her mother; Asthma in her brother and father; Colon Cancer in her brother; Hypertension in her father; Myocardial Infarction in her maternal grandfather.    RECENT DIAGNOSTIC STUDIES:     Imaging:   CT BRAIN 8/12/2005  ASYMMETRIC FRONTAL GYRI ON THE RIGHT.  POSSIBLE CONGENITAL   ANOMALY/DYSPLASIA.  CONFIRM WITH MRI AS INDICATED.     MRI BRAIN 5/3/2021  1.  Asymmetric prominence of the CSF space of  the right frontal convexity with thinning of the overlying calvarium most likely representing an arachnoid cyst.  Allowing for technical difference this is unchanged compared to study dated 8/12/2005  2.  No restricted diffusion blood product deposition or other acute finding  3.  Nonspecific T2 flair white matter hyperintensity  4.  Regressive remodeling of the right mandibular condyle indicative of internal derangement of the temporomandibular joint  5.  Mucosal thickening and retained secretion in the ethmoid sinuses.  Mucosal thickening with air-fluid level in the left maxillary sinus represent acute sinusitis.  Fluid in the mastoid air cells bilaterally     REVIEW OF SYSTEMS:                                                      10-point review of systems is negative except as mentioned above in HPI.    EXAM:                                                      Physical Exam:   Vitals: There were no vitals taken for this visit.  BMI= There is no height or weight on file to calculate BMI.  GENERAL: NAD.  HEENT: NC/AT.  PULM: Non-labored breathing.   Neurologic:  MENTAL STATUS: Alert, attentive. Speech is fluent. Normal comprehension. Normal concentration. Adequate fund of knowledge.   CRANIAL NERVES: Visual fields intact to confrontation. Pupils equally, round and reactive to light. Facial sensation and movement normal. EOM full. Hearing intact to conversation. Trapezius strength intact. Palate moves symmetrically. Tongue midline.  MOTOR: 5/5 in proximal and distal muscle groups of upper and lower extremities. Tone and bulk normal.   SENSATION: Normal light touch throughout.   COORDINATION: Normal finger nose finger. Finger tapping normal. Knee heel shin normal.  STATION AND GAIT: Romberg Negative. Good postural reflexes. Casual gait and tandem Normal  Right  hand-dominant.      ASSESSMENT and PLAN:                                                      Assessment:    ICD-10-CM    1. Migraine with aura and without  status migrainosus, not intractable  G43.109 divalproex sodium extended-release (DEPAKOTE ER) 500 MG 24 hr tablet     rizatriptan (MAXALT-MLT) 10 MG ODT     Hepatic panel        Ramila England is a 63-year-old female with history of atypical chest pain, right frontal arachnoid cyst, seizures in the past who was initially seen in our clinic on 11/5/2021 for worsening headaches and started on Depakote. She follows with Dr. Treviño. Patient has had a decrease in the frequency and severity of their headache on their current preventive therapy of Depakote 500 mg daily and abortive therapy of rizatriptan. We discussed interventions, and will plan to see the patient back in 12 months.  Ramila understands and agrees with this plan.     Plan:  --- Continue Preventive therapy: Depakote  mg at bedtime  --- Continue Abortive therapy: Maxalt MLT 10 mg sublingually for severe headache, minor headache she will take over-the-counter Excedrin or ibuprofen   --- Try Nonpharmacological interventions like heat or cold, massage, or rest  --- Practice good headache hygiene: Avoid known triggers, get adequate sleep, manage stress levels, stay hydrated and eat nutritious meals  --- Plan on follow up in the Neurology Clinic in 12 months.  --- Please feel free to reach out if you have any further questions or concerns.  --- Seek immediate medical attention if an emergency arises or if your health becomes progressively worse.     It was a pleasure meeting you today!     Total Time: Total time spent for face to face visit, reviewing labs/imaging studies, counseling and coordination of care was: 30 Minutes spent on the date of the encounter doing chart review, review of test results, patient visit and documentation     This note was dictated using voice recognition software.  Any grammatical or context distortions are unintentional and inherent to the software.    Yasmine Felipe, DNP, APRN, CNP  [facility]

## 2024-03-12 ENCOUNTER — OFFICE VISIT (OUTPATIENT)
Dept: NEUROLOGY | Facility: CLINIC | Age: 64
End: 2024-03-12
Payer: COMMERCIAL

## 2024-03-12 DIAGNOSIS — G43.109 MIGRAINE WITH AURA AND WITHOUT STATUS MIGRAINOSUS, NOT INTRACTABLE: ICD-10-CM

## 2024-03-12 PROCEDURE — 99214 OFFICE O/P EST MOD 30 MIN: CPT

## 2024-03-12 RX ORDER — RIZATRIPTAN BENZOATE 10 MG/1
10 TABLET, ORALLY DISINTEGRATING ORAL
Qty: 12 TABLET | Refills: 4 | Status: SHIPPED | OUTPATIENT
Start: 2024-03-12

## 2024-03-12 RX ORDER — DIVALPROEX SODIUM 500 MG/1
500 TABLET, EXTENDED RELEASE ORAL AT BEDTIME
Qty: 90 TABLET | Refills: 3 | Status: SHIPPED | OUTPATIENT
Start: 2024-03-12

## 2024-03-12 NOTE — PATIENT INSTRUCTIONS
Plan:  --- Continue Preventive therapy: Depakote  mg at bedtime  --- Continue Abortive therapy: Maxalt MLT 10 mg sublingually for severe headache, minor headache she will take over-the-counter Excedrin or ibuprofen   --- Try Nonpharmacological interventions like heat or cold, massage, or rest  --- Practice good headache hygiene: Avoid known triggers, get adequate sleep, manage stress levels, stay hydrated and eat nutritious meals  --- Plan on follow up in the Neurology Clinic in 12 months.  --- Please feel free to reach out if you have any further questions or concerns.  --- Seek immediate medical attention if an emergency arises or if your health becomes progressively worse.     It was a pleasure meeting you today!

## 2024-03-12 NOTE — LETTER
3/12/2024         RE: Ramila England  [address] [id number]        Dear Colleague,    Thank you for referring your patient, Ramila England, to the [facility] [place]. Please see a copy of my visit note below.      __________________________________  ESTABLISHED PATIENT NEUROLOGY NOTE    DATE OF VISIT: 3/9/2023  MRN: [id number]  PATIENT NAME: Ramila England  YOB: 1960    No chief complaint on file.    SUBJECTIVE:                                                      HISTORY OF PRESENT ILLNESS:  Ramila is here for follow up regarding migraines    Ramila England is a 63-year-old female with history of atypical chest pain, right frontal arachnoid cyst, seizures in the past who was initially seen in our clinic on 11/5/2021 for worsening headaches and started on Depakote. She follows with Dr. Treviño, last seen 02/11/2022. Per chart review, patient reported that her headache started more than a year ago but since July 2021 after she went to [place] headaches got worse and would last for a whole week.  She started having episodic headaches that would affect her activities of daily living and were associated with blurred vision and some squiggly lines in her left visual field.  Afterwards, she would develop nausea vomiting photophobia and phonophobia.  She had MRI of the head that showed a right frontal arachnoid cyst with small vessel ischemic disease and I told her that it does not require any intervention but a follow-up MRI scan in 1 year was recommended to ensure stability.  For abortive treatment she was told to take Maxalt.      03/09/23:Ramila arrives to the clinic for her annual migraine follow-up.  She describes her headaches as achy temporal pain that radiates across her forehead.  She states that Depakote has decreased the frequency and severity of her migraines.  She went from having frequent migraines to a couple of headaches per  month.  Severity went from 8-10 out of 10 down to 3-4 out of 10.  She very rarely experiences the associated symptoms of nausea anymore.  Duration went from a full day down to approximately an hour with extra strength Tylenol as abortive treatment.  She states she takes Ubrelvy or rizatriptan for more severe headaches with success.  Triggers include bright light.  Denies visual or speech changes.    03/12/24: Ramila presents to the clinic for annual migraine follow-up.  Patient reports that she continues to do well on her current regimen of Depakote 500 mg daily.  She continues to have a couple of headaches per month that are treated with ibuprofen or Tylenol.  She states she has not needed rizatriptan in months.  Patient had updated brain MRI through [facility] in 2021.  Arachnoid cyst looks to be unchanged compared to computed tomography study dated 8/12/2005. No other questions of concerns.  Patient reports that she had labs drawn through her primary care provider yesterday.  She will have them sent to the clinic for evaluation.     Current Medications:   Calcium Carbonate Antacid 1177 MG CHEW,   cetirizine (ZYRTEC) 10 MG tablet, Take 10 mg by mouth  cholecalciferol 50 MCG (2000 UT) tablet, Take 2,000 Units by mouth  escitalopram (LEXAPRO) 10 MG tablet, Take 15 mg by mouth daily   MAGNESIUM PO,   Multiple Vitamin (THERA-TABS) TABS, Take 1 tablet by mouth    No current facility-administered medications on file prior to visit.    Past Medical History:   Patient  has a past medical history of Seizure disorder (H) (2005).  Surgical History:  She  has no past surgical history on file.  Family and Social History:  Reviewed, and she  reports that she has never smoked. She has never used smokeless tobacco. She reports current alcohol use.  Reviewed, and family history includes Alzheimer Disease in her mother; Asthma in her brother and father; Colon Cancer in her brother; Hypertension in her father; Myocardial Infarction in  her maternal grandfather.    RECENT DIAGNOSTIC STUDIES:     Imaging:   CT BRAIN 8/12/2005  ASYMMETRIC FRONTAL GYRI ON THE RIGHT.  POSSIBLE CONGENITAL   ANOMALY/DYSPLASIA.  CONFIRM WITH MRI AS INDICATED.     MRI BRAIN 5/3/2021  1.  Asymmetric prominence of the CSF space of the right frontal convexity with thinning of the overlying calvarium most likely representing an arachnoid cyst.  Allowing for technical difference this is unchanged compared to study dated 8/12/2005  2.  No restricted diffusion blood product deposition or other acute finding  3.  Nonspecific T2 flair white matter hyperintensity  4.  Regressive remodeling of the right mandibular condyle indicative of internal derangement of the temporomandibular joint  5.  Mucosal thickening and retained secretion in the ethmoid sinuses.  Mucosal thickening with air-fluid level in the left maxillary sinus represent acute sinusitis.  Fluid in the mastoid air cells bilaterally     REVIEW OF SYSTEMS:                                                      10-point review of systems is negative except as mentioned above in HPI.    EXAM:                                                      Physical Exam:   Vitals: There were no vitals taken for this visit.  BMI= There is no height or weight on file to calculate BMI.  GENERAL: NAD.  HEENT: NC/AT.  PULM: Non-labored breathing.   Neurologic:  MENTAL STATUS: Alert, attentive. Speech is fluent. Normal comprehension. Normal concentration. Adequate fund of knowledge.   CRANIAL NERVES: Visual fields intact to confrontation. Pupils equally, round and reactive to light. Facial sensation and movement normal. EOM full. Hearing intact to conversation. Trapezius strength intact. Palate moves symmetrically. Tongue midline.  MOTOR: 5/5 in proximal and distal muscle groups of upper and lower extremities. Tone and bulk normal.   SENSATION: Normal light touch throughout.   COORDINATION: Normal finger nose finger. Finger tapping normal. Knee  heel shin normal.  STATION AND GAIT: Romberg Negative. Good postural reflexes. Casual gait and tandem Normal  Right  hand-dominant.      ASSESSMENT and PLAN:                                                      Assessment:    ICD-10-CM    1. Migraine with aura and without status migrainosus, not intractable  G43.109 divalproex sodium extended-release (DEPAKOTE ER) 500 MG 24 hr tablet     rizatriptan (MAXALT-MLT) 10 MG ODT     Hepatic panel        Ramila England is a 63-year-old female with history of atypical chest pain, right frontal arachnoid cyst, seizures in the past who was initially seen in our clinic on 11/5/2021 for worsening headaches and started on Depakote. She follows with Dr. Treviño. Patient has had a decrease in the frequency and severity of their headache on their current preventive therapy of Depakote 500 mg daily and abortive therapy of rizatriptan. We discussed interventions, and will plan to see the patient back in 12 months.  Ramila understands and agrees with this plan.     Plan:  --- Continue Preventive therapy: Depakote  mg at bedtime  --- Continue Abortive therapy: Maxalt MLT 10 mg sublingually for severe headache, minor headache she will take over-the-counter Excedrin or ibuprofen   --- Try Nonpharmacological interventions like heat or cold, massage, or rest  --- Practice good headache hygiene: Avoid known triggers, get adequate sleep, manage stress levels, stay hydrated and eat nutritious meals  --- Plan on follow up in the Neurology Clinic in 12 months.  --- Please feel free to reach out if you have any further questions or concerns.  --- Seek immediate medical attention if an emergency arises or if your health becomes progressively worse.     It was a pleasure meeting you today!     Total Time: Total time spent for face to face visit, reviewing labs/imaging studies, counseling and coordination of care was: 30 Minutes spent on the date of the encounter doing chart review, review of  test results, patient visit and documentation     This note was dictated using voice recognition software.  Any grammatical or context distortions are unintentional and inherent to the software.    Yasmine Felipe, MARY, APRN, CNP  [facility]           Again, thank you for allowing me to participate in the care of your patient.        Sincerely,        KAYLEIGH Pope CNP

## 2024-04-14 ENCOUNTER — HEALTH MAINTENANCE LETTER (OUTPATIENT)
Age: 64
End: 2024-04-14

## 2024-06-23 ENCOUNTER — HEALTH MAINTENANCE LETTER (OUTPATIENT)
Age: 64
End: 2024-06-23

## 2025-06-09 NOTE — PROGRESS NOTES
NEUROLOGY FOLLOW UP VISIT  NOTE       [facility] [place]  [address] [place] [address]  Tel: [phone]  Fax: [phone]  [email or web address]     Ramila England,  1960, MRN [id number]  PCP: Marianela Puente  Date: 2025      ASSESSMENT & PLAN     Visit Diagnosis  Migraine with aura and without status migrainosus, not intractable     Migraine headache with aura  64-year-old female with history of atypical chest pain, right frontal arachnoid cyst, seizure in  who returns for follow-up.  Her headaches are well-controlled on current combination of Depakote but she admits she does not use rizatriptan frequently.  I have recommended:    1.  Continue Depakote  mg at bedtime prescriptions were filled  2.  Discontinue rizatriptan and instead she will use over-the-counter pain medication for abortive treatment  3.  Repeat MRI brain  4.  Check hepatic panel  5.  Follow-up in 1 year    Thank you again for this referral, please feel free to contact me if you have any questions.    Efren Treviño MD  [facility], [place]     HISTORY OF PRESENT ILLNESS     Patient is a 64-year-old female with history of atypical chest pain, right frontal arachnoid cyst, seizures in  last seen on 2022 and subsequently by the nurse practitioner on 3/12/2024 who returns for follow-up.  During her last visit she was continued on Depakote and was told to use Maxalt for abortive treatment.  Patient has history of arachnoid cyst noted on MRI in  and plan was to repeat MRI to ensure stability.  She continues to have headaches at least 2-3 times a month and does not use rizatriptan and instead finds Tylenol to be effective    Patient was initially seen in our clinic in  for worsening headaches associated with blurred vision, nausea, vomiting and photophobia. She had MRI of the head that showed a right frontal arachnoid cyst with small vessel ischemic  "disease and I told her that it does not require any intervention but a follow-up MRI scan was recommended.     PROBLEM LIST   Patient Active Problem List   Diagnosis    Atypical chest pain    Colon polyps    Right frontal arachnoid cyst    Migraine with aura and without status migrainosus, not intractable         PAST MEDICAL & SURGICAL HISTORY     Past Medical History:   Patient  has a past medical history of Seizure disorder (H) (2005).    Surgical History:  She  has no past surgical history on file.     SOCIAL HISTORY     Reviewed, and she  reports that she has never smoked. She has never used smokeless tobacco. She reports current alcohol use.     FAMILY HISTORY     Reviewed, and family history includes Alzheimer Disease in her mother; Asthma in her brother and father; Colon Cancer in her brother; Hypertension in her father; Myocardial Infarction in her maternal grandfather.     ALLERGIES     Allergies   Allergen Reactions    Penicillins Rash         REVIEW OF SYSTEMS     A 12 point review of system was performed and was negative except as outlined in the history of present illness.     HOME MEDICATIONS     Current Outpatient Rx   Medication Sig Dispense Refill    Calcium Carbonate Antacid 1177 MG CHEW       cetirizine (ZYRTEC) 10 MG tablet Take 10 mg by mouth      cholecalciferol 50 MCG (2000 UT) tablet Take 2,000 Units by mouth      divalproex sodium extended-release (DEPAKOTE ER) 500 MG 24 hr tablet Take 1 tablet (500 mg) by mouth at bedtime. 90 tablet 3    escitalopram (LEXAPRO) 10 MG tablet Take 15 mg by mouth daily       MAGNESIUM PO       Multiple Vitamin (THERA-TABS) TABS Take 1 tablet by mouth           PHYSICAL EXAM     Vital signs  /76 (BP Location: Right arm, Patient Position: Sitting)   Pulse 69   Ht 1.702 m (5' 7.01\")   Wt 76.7 kg (169 lb)   BMI 26.46 kg/m      Weight:   169 lbs 0 oz    Patient is alert and oriented x4 in no acute distress. Vital signs were reviewed and are documented in " electronic medical record. Neck was supple, no carotid bruits, thyromegaly, JVD, or lymphadenopathy was noted.   NEUROLOGY EXAM:   Patient s speech was normal with no aphasia or dysarthria. Mentation, and affect were also normal.    Funduscopic exam was normal, with normal cup to disc ratio. Cranial nerves II -XII were intact.    Patient had normal mass, tone and motor strength was 5/5 in all extremities without pronator drift.    Sensation was intact to light touch, pinprick, and vibratory sensation.    Reflexes were 1+ symmetrical with downgoing toes.    No dysmetria noted on FNF or HKS. Romberg was negative.   Gait testing was normal. Able to walk on toes/heels. Tandem walk normal.     PERTINENT DIAGNOSTIC STUDIES     Following studies were reviewed:     CT BRAIN 8/12/2005  ASYMMETRIC FRONTAL GYRI ON THE RIGHT.  POSSIBLE CONGENITAL   ANOMALY/DYSPLASIA.  CONFIRM WITH MRI AS INDICATED.     MRI BRAIN 5/3/2021  1.  Asymmetric prominence of the CSF space of the right frontal convexity with thinning of the overlying calvarium most likely representing an arachnoid cyst.  Allowing for technical difference this is unchanged compared to study dated 8/12/2005  2.  No restricted diffusion blood product deposition or other acute finding  3.  Nonspecific T2 flair white matter hyperintensity  4.  Regressive remodeling of the right mandibular condyle indicative of internal derangement of the temporomandibular joint  5.  Mucosal thickening and retained secretion in the ethmoid sinuses.  Mucosal thickening with air-fluid level in the left maxillary sinus represent acute sinusitis.  Fluid in the mastoid air cells bilaterally     PERTINENT LABS  Following labs were reviewed:  Lab on 06/18/2025   Component Date Value Ref Range Status    Protein Total 06/18/2025 6.8  6.4 - 8.3 g/dL Final    Albumin 06/18/2025 4.3  3.5 - 5.2 g/dL Final    Bilirubin Total 06/18/2025 0.3  <=1.2 mg/dL Final    Alkaline Phosphatase 06/18/2025 56  40 - 150  U/L Final    AST 06/18/2025 28  0 - 45 U/L Final    ALT 06/18/2025 24  0 - 50 U/L Final    Bilirubin Direct 06/18/2025 0.11  0.00 - 0.30 mg/dL Final         Total time spent for face to face visit, reviewing labs/imaging studies, counseling and coordination of care was: 30 Minutes spent on the date of the encounter doing chart review, review of outside records, review of test results, interpretation of tests, patient visit, and documentation     The longitudinal plan of care for the diagnosis(es)/condition(s) as documented were addressed during this visit. Due to the added complexity in care, I will continue to support Ramila in the subsequent management and with ongoing continuity of care.    This note was dictated using voice recognition software.  Any grammatical or context distortions are unintentional and inherent to the software.    Orders Placed This Encounter   Procedures    MR Brain w/o & w Contrast    Hepatic function panel      New Prescriptions    No medications on file     Modified Medications    Modified Medication Previous Medication    DIVALPROEX SODIUM EXTENDED-RELEASE (DEPAKOTE ER) 500 MG 24 HR TABLET divalproex sodium extended-release (DEPAKOTE ER) 500 MG 24 hr tablet       Take 1 tablet (500 mg) by mouth at bedtime.    TAKE 1 TABLET(500 MG) BY MOUTH AT BEDTIME

## 2025-06-18 ENCOUNTER — OFFICE VISIT (OUTPATIENT)
Dept: NEUROLOGY | Facility: CLINIC | Age: 65
End: 2025-06-18
Payer: COMMERCIAL

## 2025-06-18 ENCOUNTER — RESULTS FOLLOW-UP (OUTPATIENT)
Dept: NEUROLOGY | Facility: CLINIC | Age: 65
End: 2025-06-18

## 2025-06-18 ENCOUNTER — LAB (OUTPATIENT)
Dept: LAB | Facility: HOSPITAL | Age: 65
End: 2025-06-18
Payer: COMMERCIAL

## 2025-06-18 VITALS
HEART RATE: 69 BPM | WEIGHT: 169 LBS | SYSTOLIC BLOOD PRESSURE: 112 MMHG | BODY MASS INDEX: 26.53 KG/M2 | HEIGHT: 67 IN | DIASTOLIC BLOOD PRESSURE: 76 MMHG

## 2025-06-18 DIAGNOSIS — G43.109 MIGRAINE WITH AURA AND WITHOUT STATUS MIGRAINOSUS, NOT INTRACTABLE: Primary | ICD-10-CM

## 2025-06-18 DIAGNOSIS — G93.0 ARACHNOID CYST: ICD-10-CM

## 2025-06-18 DIAGNOSIS — G43.109 MIGRAINE WITH AURA AND WITHOUT STATUS MIGRAINOSUS, NOT INTRACTABLE: ICD-10-CM

## 2025-06-18 LAB
ALBUMIN SERPL BCG-MCNC: 4.3 G/DL (ref 3.5–5.2)
ALP SERPL-CCNC: 56 U/L (ref 40–150)
ALT SERPL W P-5'-P-CCNC: 24 U/L (ref 0–50)
AST SERPL W P-5'-P-CCNC: 28 U/L (ref 0–45)
BILIRUB DIRECT SERPL-MCNC: 0.11 MG/DL (ref 0–0.3)
BILIRUB SERPL-MCNC: 0.3 MG/DL
PROT SERPL-MCNC: 6.8 G/DL (ref 6.4–8.3)

## 2025-06-18 PROCEDURE — G2211 COMPLEX E/M VISIT ADD ON: HCPCS | Performed by: PSYCHIATRY & NEUROLOGY

## 2025-06-18 PROCEDURE — 99214 OFFICE O/P EST MOD 30 MIN: CPT | Performed by: PSYCHIATRY & NEUROLOGY

## 2025-06-18 PROCEDURE — 3074F SYST BP LT 130 MM HG: CPT | Performed by: PSYCHIATRY & NEUROLOGY

## 2025-06-18 PROCEDURE — 84155 ASSAY OF PROTEIN SERUM: CPT

## 2025-06-18 PROCEDURE — 36415 COLL VENOUS BLD VENIPUNCTURE: CPT

## 2025-06-18 PROCEDURE — 3078F DIAST BP <80 MM HG: CPT | Performed by: PSYCHIATRY & NEUROLOGY

## 2025-06-18 RX ORDER — DIVALPROEX SODIUM 500 MG/1
500 TABLET, FILM COATED, EXTENDED RELEASE ORAL AT BEDTIME
Qty: 90 TABLET | Refills: 3 | Status: SHIPPED | OUTPATIENT
Start: 2025-06-18

## 2025-06-18 NOTE — LETTER
2025      Ramila England  [address] [address]      Dear Colleague,    Thank you for referring your patient, Ramila England, to the [facility] [place]. Please see a copy of my visit note below.    NEUROLOGY FOLLOW UP VISIT  NOTE       [facility] [place]  [address] [place] [address]  Tel: [phone]  Fax: [phone]  [email or web address]     Ramila England,  1960, MRN [id number]  PCP: Marianela Puente  Date: 2025      ASSESSMENT & PLAN     Visit Diagnosis  Migraine with aura and without status migrainosus, not intractable     Migraine headache with aura  64-year-old female with history of atypical chest pain, right frontal arachnoid cyst, seizure in  who returns for follow-up.  Her headaches are well-controlled on current combination of Depakote but she admits she does not use rizatriptan frequently.  I have recommended:    1.  Continue Depakote  mg at bedtime prescriptions were filled  2.  Discontinue rizatriptan and instead she will use over-the-counter pain medication for abortive treatment  3.  Repeat MRI brain  4.  Check hepatic panel  5.  Follow-up in 1 year    Thank you again for this referral, please feel free to contact me if you have any questions.    Efren Treviño MD  [facility], [place]     HISTORY OF PRESENT ILLNESS     Patient is a 64-year-old female with history of atypical chest pain, right frontal arachnoid cyst, seizures in  last seen on 2022 and subsequently by the nurse practitioner on 3/12/2024 who returns for follow-up.  During her last visit she was continued on Depakote and was told to use Maxalt for abortive treatment.  Patient has history of arachnoid cyst noted on MRI in  and plan was to repeat MRI to ensure stability.  She continues to have headaches at least 2-3 times a month and does not use rizatriptan and instead finds  Tylenol to be effective    Patient was initially seen in our clinic in 2021 for worsening headaches associated with blurred vision, nausea, vomiting and photophobia. She had MRI of the head that showed a right frontal arachnoid cyst with small vessel ischemic disease and I told her that it does not require any intervention but a follow-up MRI scan was recommended.     PROBLEM LIST   Patient Active Problem List   Diagnosis     Atypical chest pain     Colon polyps     Right frontal arachnoid cyst     Migraine with aura and without status migrainosus, not intractable         PAST MEDICAL & SURGICAL HISTORY     Past Medical History:   Patient  has a past medical history of Seizure disorder (H) (2005).    Surgical History:  She  has no past surgical history on file.     SOCIAL HISTORY     Reviewed, and she  reports that she has never smoked. She has never used smokeless tobacco. She reports current alcohol use.     FAMILY HISTORY     Reviewed, and family history includes Alzheimer Disease in her mother; Asthma in her brother and father; Colon Cancer in her brother; Hypertension in her father; Myocardial Infarction in her maternal grandfather.     ALLERGIES     Allergies   Allergen Reactions     Penicillins Rash         REVIEW OF SYSTEMS     A 12 point review of system was performed and was negative except as outlined in the history of present illness.     HOME MEDICATIONS     Current Outpatient Rx   Medication Sig Dispense Refill     Calcium Carbonate Antacid 1177 MG CHEW        cetirizine (ZYRTEC) 10 MG tablet Take 10 mg by mouth       cholecalciferol 50 MCG (2000 UT) tablet Take 2,000 Units by mouth       divalproex sodium extended-release (DEPAKOTE ER) 500 MG 24 hr tablet Take 1 tablet (500 mg) by mouth at bedtime. 90 tablet 3     escitalopram (LEXAPRO) 10 MG tablet Take 15 mg by mouth daily        MAGNESIUM PO        Multiple Vitamin (THERA-TABS) TABS Take 1 tablet by mouth           PHYSICAL EXAM     Vital signs  BP  "112/76 (BP Location: Right arm, Patient Position: Sitting)   Pulse 69   Ht 1.702 m (5' 7.01\")   Wt 76.7 kg (169 lb)   BMI 26.46 kg/m      Weight:   169 lbs 0 oz    Patient is alert and oriented x4 in no acute distress. Vital signs were reviewed and are documented in electronic medical record. Neck was supple, no carotid bruits, thyromegaly, JVD, or lymphadenopathy was noted.   NEUROLOGY EXAM:    Patient s speech was normal with no aphasia or dysarthria. Mentation, and affect were also normal.     Funduscopic exam was normal, with normal cup to disc ratio. Cranial nerves II -XII were intact.     Patient had normal mass, tone and motor strength was 5/5 in all extremities without pronator drift.     Sensation was intact to light touch, pinprick, and vibratory sensation.     Reflexes were 1+ symmetrical with downgoing toes.     No dysmetria noted on FNF or HKS. Romberg was negative.    Gait testing was normal. Able to walk on toes/heels. Tandem walk normal.     PERTINENT DIAGNOSTIC STUDIES     Following studies were reviewed:     CT BRAIN 8/12/2005  ASYMMETRIC FRONTAL GYRI ON THE RIGHT.  POSSIBLE CONGENITAL   ANOMALY/DYSPLASIA.  CONFIRM WITH MRI AS INDICATED.     MRI BRAIN 5/3/2021  1.  Asymmetric prominence of the CSF space of the right frontal convexity with thinning of the overlying calvarium most likely representing an arachnoid cyst.  Allowing for technical difference this is unchanged compared to study dated 8/12/2005  2.  No restricted diffusion blood product deposition or other acute finding  3.  Nonspecific T2 flair white matter hyperintensity  4.  Regressive remodeling of the right mandibular condyle indicative of internal derangement of the temporomandibular joint  5.  Mucosal thickening and retained secretion in the ethmoid sinuses.  Mucosal thickening with air-fluid level in the left maxillary sinus represent acute sinusitis.  Fluid in the mastoid air cells bilaterally     PERTINENT LABS  Following labs " were reviewed:  Lab on 06/18/2025   Component Date Value Ref Range Status     Protein Total 06/18/2025 6.8  6.4 - 8.3 g/dL Final     Albumin 06/18/2025 4.3  3.5 - 5.2 g/dL Final     Bilirubin Total 06/18/2025 0.3  <=1.2 mg/dL Final     Alkaline Phosphatase 06/18/2025 56  40 - 150 U/L Final     AST 06/18/2025 28  0 - 45 U/L Final     ALT 06/18/2025 24  0 - 50 U/L Final     Bilirubin Direct 06/18/2025 0.11  0.00 - 0.30 mg/dL Final         Total time spent for face to face visit, reviewing labs/imaging studies, counseling and coordination of care was: 30 Minutes spent on the date of the encounter doing chart review, review of outside records, review of test results, interpretation of tests, patient visit, and documentation     The longitudinal plan of care for the diagnosis(es)/condition(s) as documented were addressed during this visit. Due to the added complexity in care, I will continue to support Ramila in the subsequent management and with ongoing continuity of care.    This note was dictated using voice recognition software.  Any grammatical or context distortions are unintentional and inherent to the software.    Orders Placed This Encounter   Procedures     MR Brain w/o & w Contrast     Hepatic function panel      New Prescriptions    No medications on file     Modified Medications    Modified Medication Previous Medication    DIVALPROEX SODIUM EXTENDED-RELEASE (DEPAKOTE ER) 500 MG 24 HR TABLET divalproex sodium extended-release (DEPAKOTE ER) 500 MG 24 hr tablet       Take 1 tablet (500 mg) by mouth at bedtime.    TAKE 1 TABLET(500 MG) BY MOUTH AT BEDTIME                 Again, thank you for allowing me to participate in the care of your patient.        Sincerely,        Efren Treviño MD    Electronically signed

## 2025-06-18 NOTE — NURSING NOTE
Chief Complaint   Patient presents with    Headache     Migraines are better- will get them once in a while in the heat.  Medication is working.      Archana Mejía CMA on 6/18/2025 at 11:25 AM

## 2025-07-02 ENCOUNTER — HOSPITAL ENCOUNTER (OUTPATIENT)
Dept: MRI IMAGING | Facility: HOSPITAL | Age: 65
Discharge: HOME | End: 2025-07-02
Attending: PSYCHIATRY & NEUROLOGY
Payer: COMMERCIAL

## 2025-07-02 DIAGNOSIS — G43.109 MIGRAINE WITH AURA AND WITHOUT STATUS MIGRAINOSUS, NOT INTRACTABLE: ICD-10-CM

## 2025-07-02 DIAGNOSIS — G93.0 ARACHNOID CYST: ICD-10-CM

## 2025-07-02 PROCEDURE — 255N000002 HC RX 255 OP 636: Performed by: PSYCHIATRY & NEUROLOGY

## 2025-07-02 PROCEDURE — 70553 MRI BRAIN STEM W/O & W/DYE: CPT

## 2025-07-02 PROCEDURE — A9585 GADOBUTROL INJECTION: HCPCS | Performed by: PSYCHIATRY & NEUROLOGY

## 2025-07-02 RX ORDER — GADOBUTROL 604.72 MG/ML
0.1 INJECTION INTRAVENOUS ONCE
Status: COMPLETED | OUTPATIENT
Start: 2025-07-02 | End: 2025-07-02

## 2025-07-02 RX ADMIN — GADOBUTROL 7 ML: 604.72 INJECTION INTRAVENOUS at 11:32

## 2025-07-12 ENCOUNTER — HEALTH MAINTENANCE LETTER (OUTPATIENT)
Age: 65
End: 2025-07-12